# Patient Record
Sex: FEMALE | Race: ASIAN | NOT HISPANIC OR LATINO | Employment: FULL TIME | URBAN - METROPOLITAN AREA
[De-identification: names, ages, dates, MRNs, and addresses within clinical notes are randomized per-mention and may not be internally consistent; named-entity substitution may affect disease eponyms.]

---

## 2017-08-14 ENCOUNTER — LAB (OUTPATIENT)
Dept: FAMILY MEDICINE CLINIC | Facility: CLINIC | Age: 57
End: 2017-08-14

## 2017-08-14 DIAGNOSIS — R73.9 ELEVATED BLOOD SUGAR: ICD-10-CM

## 2017-08-14 DIAGNOSIS — Z00.00 ROUTINE GENERAL MEDICAL EXAMINATION AT A HEALTH CARE FACILITY: Primary | ICD-10-CM

## 2017-08-14 LAB
ALBUMIN SERPL-MCNC: 4.2 G/DL (ref 3.5–5.2)
ALBUMIN/GLOB SERPL: 1.4 G/DL
ALP SERPL-CCNC: 51 U/L (ref 39–117)
ALT SERPL W P-5'-P-CCNC: 17 U/L (ref 1–33)
ANION GAP SERPL CALCULATED.3IONS-SCNC: 12.1 MMOL/L
AST SERPL-CCNC: 15 U/L (ref 1–32)
BACTERIA UR QL AUTO: ABNORMAL /HPF
BILIRUB SERPL-MCNC: 0.4 MG/DL (ref 0.1–1.2)
BILIRUB UR QL STRIP: NEGATIVE
BUN BLD-MCNC: 8 MG/DL (ref 6–20)
BUN/CREAT SERPL: 11.6 (ref 7–25)
CALCIUM SPEC-SCNC: 9.5 MG/DL (ref 8.6–10.5)
CHLORIDE SERPL-SCNC: 102 MMOL/L (ref 98–107)
CHOLEST SERPL-MCNC: 252 MG/DL (ref 0–200)
CLARITY UR: CLEAR
CO2 SERPL-SCNC: 25.9 MMOL/L (ref 22–29)
COLOR UR: YELLOW
CREAT BLD-MCNC: 0.69 MG/DL (ref 0.57–1)
ERYTHROCYTE [DISTWIDTH] IN BLOOD BY AUTOMATED COUNT: 12.6 % (ref 4.5–15)
GFR SERPL CREATININE-BSD FRML MDRD: 106 ML/MIN/1.73
GFR SERPL CREATININE-BSD FRML MDRD: 87 ML/MIN/1.73
GLOBULIN UR ELPH-MCNC: 2.9 GM/DL
GLUCOSE BLD-MCNC: 129 MG/DL (ref 65–99)
GLUCOSE UR STRIP-MCNC: NEGATIVE MG/DL
HBA1C MFR BLD: 6.2 % (ref 4.8–5.6)
HCT VFR BLD AUTO: 41.5 % (ref 31–42)
HDLC SERPL-MCNC: 48 MG/DL (ref 40–60)
HGB BLD-MCNC: 13.6 G/DL (ref 12–18)
HGB UR QL STRIP.AUTO: ABNORMAL
KETONES UR QL STRIP: NEGATIVE
LDLC SERPL CALC-MCNC: 176 MG/DL (ref 0–100)
LDLC/HDLC SERPL: 3.67 {RATIO}
LEUKOCYTE ESTERASE UR QL STRIP.AUTO: ABNORMAL
LYMPHOCYTES # BLD AUTO: 2 10*3/MM3 (ref 1.2–3.4)
LYMPHOCYTES NFR BLD AUTO: 33.8 % (ref 21–51)
MCH RBC QN AUTO: 28.1 PG (ref 26.1–33.1)
MCHC RBC AUTO-ENTMCNC: 32.9 G/DL (ref 33–37)
MCV RBC AUTO: 85.3 FL (ref 80–99)
MONOCYTES # BLD AUTO: 0.2 10*3/MM3 (ref 0.1–0.6)
MONOCYTES NFR BLD AUTO: 3.9 % (ref 2–9)
NEUTROPHILS # BLD AUTO: 3.7 10*3/MM3 (ref 1.4–6.5)
NEUTROPHILS NFR BLD AUTO: 62.3 % (ref 42–75)
NITRITE UR QL STRIP: NEGATIVE
PH UR STRIP.AUTO: 6.5 [PH] (ref 4.6–8)
PLATELET # BLD AUTO: 240 10*3/MM3 (ref 150–450)
PMV BLD AUTO: 8 FL (ref 7.1–10.5)
POTASSIUM BLD-SCNC: 4.4 MMOL/L (ref 3.5–5.2)
PROT SERPL-MCNC: 7.1 G/DL (ref 6–8.5)
PROT UR QL STRIP: NEGATIVE
RBC # BLD AUTO: 4.86 10*6/MM3 (ref 4–6)
RBC # UR: ABNORMAL /HPF
REF LAB TEST METHOD: ABNORMAL
SODIUM BLD-SCNC: 140 MMOL/L (ref 136–145)
SP GR UR STRIP: 1.01 (ref 1–1.03)
SQUAMOUS #/AREA URNS HPF: ABNORMAL /HPF
TRIGL SERPL-MCNC: 139 MG/DL (ref 0–150)
TSH SERPL DL<=0.05 MIU/L-ACNC: 2.11 MIU/ML (ref 0.27–4.2)
UROBILINOGEN UR QL STRIP: ABNORMAL
VLDLC SERPL-MCNC: 27.8 MG/DL (ref 5–40)
WBC NRBC COR # BLD: 5.9 10*3/MM3 (ref 4.5–10)
WBC UR QL AUTO: ABNORMAL /HPF

## 2017-08-14 PROCEDURE — 80053 COMPREHEN METABOLIC PANEL: CPT | Performed by: NURSE PRACTITIONER

## 2017-08-14 PROCEDURE — 81001 URINALYSIS AUTO W/SCOPE: CPT | Performed by: NURSE PRACTITIONER

## 2017-08-14 PROCEDURE — 36415 COLL VENOUS BLD VENIPUNCTURE: CPT | Performed by: NURSE PRACTITIONER

## 2017-08-14 PROCEDURE — 83036 HEMOGLOBIN GLYCOSYLATED A1C: CPT | Performed by: NURSE PRACTITIONER

## 2017-08-14 PROCEDURE — 80061 LIPID PANEL: CPT | Performed by: NURSE PRACTITIONER

## 2017-08-14 PROCEDURE — 85025 COMPLETE CBC W/AUTO DIFF WBC: CPT | Performed by: NURSE PRACTITIONER

## 2017-08-14 PROCEDURE — 84443 ASSAY THYROID STIM HORMONE: CPT | Performed by: NURSE PRACTITIONER

## 2017-08-21 ENCOUNTER — OFFICE VISIT (OUTPATIENT)
Dept: FAMILY MEDICINE CLINIC | Facility: CLINIC | Age: 57
End: 2017-08-21

## 2017-08-21 VITALS
DIASTOLIC BLOOD PRESSURE: 74 MMHG | HEIGHT: 63 IN | HEART RATE: 85 BPM | BODY MASS INDEX: 32.78 KG/M2 | TEMPERATURE: 97.8 F | WEIGHT: 185 LBS | OXYGEN SATURATION: 99 % | SYSTOLIC BLOOD PRESSURE: 118 MMHG

## 2017-08-21 DIAGNOSIS — E78.5 HYPERLIPIDEMIA, UNSPECIFIED HYPERLIPIDEMIA TYPE: ICD-10-CM

## 2017-08-21 DIAGNOSIS — R94.31 ABNORMAL EKG: ICD-10-CM

## 2017-08-21 DIAGNOSIS — Z12.11 COLON CANCER SCREENING: ICD-10-CM

## 2017-08-21 DIAGNOSIS — Z11.59 NEED FOR HEPATITIS C SCREENING TEST: ICD-10-CM

## 2017-08-21 DIAGNOSIS — R10.9 LEFT FLANK PAIN: ICD-10-CM

## 2017-08-21 DIAGNOSIS — E03.9 HYPOTHYROIDISM, UNSPECIFIED TYPE: ICD-10-CM

## 2017-08-21 DIAGNOSIS — R31.9 HEMATURIA: ICD-10-CM

## 2017-08-21 DIAGNOSIS — Z01.419 ENCOUNTER FOR GYNECOLOGICAL EXAMINATION: ICD-10-CM

## 2017-08-21 DIAGNOSIS — K21.9 GASTROESOPHAGEAL REFLUX DISEASE, ESOPHAGITIS PRESENCE NOT SPECIFIED: ICD-10-CM

## 2017-08-21 DIAGNOSIS — Z78.0 POSTMENOPAUSAL: ICD-10-CM

## 2017-08-21 DIAGNOSIS — Z00.00 ANNUAL PHYSICAL EXAM: Primary | ICD-10-CM

## 2017-08-21 DIAGNOSIS — Z12.31 ENCOUNTER FOR SCREENING MAMMOGRAM FOR BREAST CANCER: ICD-10-CM

## 2017-08-21 DIAGNOSIS — I10 ESSENTIAL HYPERTENSION: ICD-10-CM

## 2017-08-21 LAB
BACTERIA UR QL AUTO: ABNORMAL /HPF
BILIRUB UR QL STRIP: NEGATIVE
CLARITY UR: CLEAR
COLOR UR: YELLOW
GLUCOSE UR STRIP-MCNC: NEGATIVE MG/DL
HCV AB SER DONR QL: NORMAL
HEMOCCULT STL QL IA: NEGATIVE
HGB UR QL STRIP.AUTO: ABNORMAL
KETONES UR QL STRIP: NEGATIVE
LEUKOCYTE ESTERASE UR QL STRIP.AUTO: ABNORMAL
NITRITE UR QL STRIP: NEGATIVE
PH UR STRIP.AUTO: 7 [PH] (ref 4.6–8)
PROT UR QL STRIP: NEGATIVE
RBC # UR: ABNORMAL /HPF
REF LAB TEST METHOD: ABNORMAL
SP GR UR STRIP: 1.01 (ref 1–1.03)
SQUAMOUS #/AREA URNS HPF: ABNORMAL /HPF
UROBILINOGEN UR QL STRIP: ABNORMAL
WBC UR QL AUTO: ABNORMAL /HPF

## 2017-08-21 PROCEDURE — 99386 PREV VISIT NEW AGE 40-64: CPT | Performed by: NURSE PRACTITIONER

## 2017-08-21 PROCEDURE — 99204 OFFICE O/P NEW MOD 45 MIN: CPT | Performed by: NURSE PRACTITIONER

## 2017-08-21 PROCEDURE — 81001 URINALYSIS AUTO W/SCOPE: CPT | Performed by: NURSE PRACTITIONER

## 2017-08-21 PROCEDURE — 82274 ASSAY TEST FOR BLOOD FECAL: CPT | Performed by: NURSE PRACTITIONER

## 2017-08-21 PROCEDURE — 86803 HEPATITIS C AB TEST: CPT | Performed by: NURSE PRACTITIONER

## 2017-08-21 PROCEDURE — 93000 ELECTROCARDIOGRAM COMPLETE: CPT | Performed by: NURSE PRACTITIONER

## 2017-08-21 PROCEDURE — 87086 URINE CULTURE/COLONY COUNT: CPT | Performed by: NURSE PRACTITIONER

## 2017-08-21 PROCEDURE — 36415 COLL VENOUS BLD VENIPUNCTURE: CPT | Performed by: NURSE PRACTITIONER

## 2017-08-21 RX ORDER — LEVOTHYROXINE SODIUM 0.05 MG/1
50 TABLET ORAL DAILY
COMMUNITY
End: 2017-08-21 | Stop reason: SDUPTHER

## 2017-08-21 RX ORDER — LISINOPRIL AND HYDROCHLOROTHIAZIDE 25; 20 MG/1; MG/1
1 TABLET ORAL DAILY
COMMUNITY
End: 2017-08-21 | Stop reason: SDUPTHER

## 2017-08-21 RX ORDER — LISINOPRIL AND HYDROCHLOROTHIAZIDE 25; 20 MG/1; MG/1
1 TABLET ORAL DAILY
Qty: 30 TABLET | Refills: 5 | Status: SHIPPED | OUTPATIENT
Start: 2017-08-21 | End: 2018-03-30 | Stop reason: DRUGHIGH

## 2017-08-21 RX ORDER — RANITIDINE 150 MG/1
150 TABLET ORAL 2 TIMES DAILY
Qty: 150 TABLET | Refills: 2 | Status: SHIPPED | OUTPATIENT
Start: 2017-08-21 | End: 2018-09-11

## 2017-08-21 RX ORDER — RANITIDINE 150 MG/1
150 CAPSULE ORAL 2 TIMES DAILY
Qty: 60 CAPSULE | Refills: 2 | Status: SHIPPED | OUTPATIENT
Start: 2017-08-21 | End: 2017-08-21 | Stop reason: ALTCHOICE

## 2017-08-21 RX ORDER — LEVOTHYROXINE SODIUM 0.05 MG/1
50 TABLET ORAL DAILY
Qty: 30 TABLET | Refills: 5 | Status: SHIPPED | OUTPATIENT
Start: 2017-08-21 | End: 2018-03-30 | Stop reason: SDUPTHER

## 2017-08-21 NOTE — PATIENT INSTRUCTIONS
complete physical today, CBE, pap, pelvic, order mammo, order overdue colonoscopy, EKG abnl order stress echo, reviewed labs will restart chol medication pending pt calling back with prev medication, refill lisinopril/hctz and levothyroxine, trial ranitidine 150 mg BID prn and if persist consider GI referral, order renal US and pending urine culture  Food Choices for Gastroesophageal Reflux Disease, Adult  When you have gastroesophageal reflux disease (GERD), the foods you eat and your eating habits are very important. Choosing the right foods can help ease the discomfort of GERD.  WHAT GENERAL GUIDELINES DO I NEED TO FOLLOW?  · Choose fruits, vegetables, whole grains, low-fat dairy products, and low-fat meat, fish, and poultry.  · Limit fats such as oils, salad dressings, butter, nuts, and avocado.  · Keep a food diary to identify foods that cause symptoms.  · Avoid foods that cause reflux. These may be different for different people.  · Eat frequent small meals instead of three large meals each day.  · Eat your meals slowly, in a relaxed setting.  · Limit fried foods.  · Cook foods using methods other than frying.  · Avoid drinking alcohol.  · Avoid drinking large amounts of liquids with your meals.  · Avoid bending over or lying down until 2-3 hours after eating.  WHAT FOODS ARE NOT RECOMMENDED?  The following are some foods and drinks that may worsen your symptoms:  Vegetables  Tomatoes. Tomato juice. Tomato and spaghetti sauce. Chili peppers. Onion and garlic. Horseradish.  Fruits  Oranges, grapefruit, and lemon (fruit and juice).  Meats  High-fat meats, fish, and poultry. This includes hot dogs, ribs, ham, sausage, salami, and daniels.  Dairy  Whole milk and chocolate milk. Sour cream. Cream. Butter. Ice cream. Cream cheese.   Beverages  Coffee and tea, with or without caffeine. Carbonated beverages or energy drinks.  Condiments  Hot sauce. Barbecue sauce.   Sweets/Desserts  Chocolate and cocoa. Donuts.  Peppermint and spearmint.  Fats and Oils  High-fat foods, including French fries and potato chips.  Other  Vinegar. Strong spices, such as black pepper, white pepper, red pepper, cayenne, mauro powder, cloves, ginger, and chili powder.  The items listed above may not be a complete list of foods and beverages to avoid. Contact your dietitian for more information.     This information is not intended to replace advice given to you by your health care provider. Make sure you discuss any questions you have with your health care provider.     Document Released: 12/18/2006 Document Revised: 01/08/2016 Document Reviewed: 10/22/2014  [employer] Interactive Patient Education ©2017 [employer]

## 2017-08-21 NOTE — PROGRESS NOTES
Subjective   Iain Frias is a 57 y.o. female.     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  With hypothyroid on levothyroxine 50 mcg 1 PO daily x 4 mo, no missed doses, Has lost approx 15 lbs since being on medication working on healthy diet  With HTN on lisinopril/HCTZ 20/25 mg daily no CP dizziness HA LE edema  Hx of pneumonia 2 years ago and since then has occ cough, now with worsening indigestion and heartburn and feels like difficult to digest food, Hx of constipation and takes OTC prn, no prev colonoscopy  Last mammo 2.5-3 years no breast pain, lumps, nipple dc, last pap > 2 years  C/o extreme pain L flank few weeks ago noticed blood in urine and took OTC herbal remedy and helped, now with pain 2/10, pain worse after okra and spinach and thinks may have kidney stone, no hx of stone or diverticulitis, hx of CT abd few years ago normal    Iain Frias 57 y.o. female is  2, Para 2 who presents for annual exam. The patient has no complaints today of vag dc, odor, itch, pain, dysuria, breast pain, lumps, nipple dc. The patient is not sexually active. GYN screening history: last pap: was normal approx 2-3 years ago. The patient is not taking hormone replacement therapy. Patient denies post-menopausal vaginal bleeding.. The patient participates in regular exercise: yes.  History of abnormal Pap smear: no  Family history of uterine or ovarian cancer: no  Family history of colon caner:  no  History of abnormal mammogram: no  Family history of breast cancer: no  Colonoscopy history:   Never had, order placed today  DEXA scan:  Never had, request order placed today  She declines STD testing.    The following portions of the patient's history were reviewed and updated as appropriate: allergies, current medications, past family history, past medical history, past social history, past surgical history and problem list.    Review of Systems    Constitutional: Negative for fever.   Respiratory: Negative for cough, shortness of breath and wheezing.    Cardiovascular: Negative for chest pain, palpitations and leg swelling.   Gastrointestinal: Positive for abdominal pain. Negative for abdominal distention, anal bleeding, blood in stool, constipation, diarrhea, nausea, rectal pain and vomiting.        Gerd   Genitourinary: Positive for flank pain and hematuria. Negative for decreased urine volume, difficulty urinating, dyspareunia, dysuria, frequency, menstrual problem, pelvic pain, urgency, vaginal bleeding, vaginal discharge and vaginal pain.   Musculoskeletal: Negative for arthralgias and back pain.   Neurological: Negative for dizziness and headaches.   All other systems reviewed and are negative.      Objective   Physical Exam   Constitutional: She is oriented to person, place, and time. She appears well-developed and well-nourished.   HENT:   Head: Normocephalic and atraumatic.   Eyes: Conjunctivae and EOM are normal. Pupils are equal, round, and reactive to light.   Cardiovascular: Normal rate, regular rhythm and normal heart sounds.    Pulmonary/Chest: Effort normal and breath sounds normal. Right breast exhibits no inverted nipple, no nipple discharge and no skin change. Left breast exhibits no inverted nipple, no nipple discharge and no skin change. Breasts are symmetrical. There is no breast swelling.   Abdominal: Soft. Bowel sounds are normal. She exhibits no distension and no mass. There is no tenderness. There is CVA tenderness (left). There is no rebound and no guarding. No hernia.   Genitourinary: Vagina normal and uterus normal. No breast tenderness, discharge or bleeding. Cervix exhibits no discharge. Right adnexum displays no tenderness. Left adnexum displays no tenderness.   Musculoskeletal: Normal range of motion.   Neurological: She is alert and oriented to person, place, and time.   Skin: Skin is warm and dry.   Psychiatric: She has a  normal mood and affect. Her behavior is normal. Judgment and thought content normal.   Vitals reviewed.    ECG 12 Lead  Date/Time: 8/21/2017 11:15 AM  Performed by: VILLA MORGAN  Authorized by: VILLA MORGAN   Comparison: not compared with previous ECG   Rhythm: sinus rhythm  Rate: normal  Conduction: conduction normal  ST Segments: ST segments normal  T depression: V1, V3 and V4  QRS axis: normal  Clinical impression: abnormal ECG          Assessment/Plan   Iain was seen today for annual exam.    Diagnoses and all orders for this visit:    Annual physical exam  -     Occult Blood (Immunoassay)  -     ECG 12 Lead    Colon cancer screening  -     Ambulatory Referral to General Surgery    Essential hypertension    Hyperlipidemia, unspecified hyperlipidemia type    Hypothyroidism, unspecified type    Need for hepatitis C screening test  -     Hepatitis C Antibody    Encounter for gynecological examination  -     Pap IG (Image Guided)    Encounter for screening mammogram for breast cancer  -     Mammo Screening Bilateral With CAD; Future    Left flank pain  -     Urinalysis With Microscopic  -     US Renal Bilateral; Future  -     Urinalysis  -     Urinalysis, Microscopic Only  -     Urine Culture    Hematuria  -     Urine Culture    Abnormal EKG  -     Adult Stress Echo With Color & Doppler; Future  -     ECG 12 Lead    Gastroesophageal reflux disease, esophagitis presence not specified    Postmenopausal  -     DEXA Bone Density Axial; Future    Other orders  -     Discontinue: ranitidine (ZANTAC) 150 MG capsule; Take 1 capsule by mouth 2 (Two) Times a Day.  -     raNITIdine (ZANTAC) 150 MG tablet; Take 1 tablet by mouth 2 (Two) Times a Day.  -     levothyroxine (SYNTHROID, LEVOTHROID) 50 MCG tablet; Take 1 tablet by mouth Daily.  -     lisinopril-hydrochlorothiazide (PRINZIDE,ZESTORETIC) 20-25 MG per tablet; Take 1 tablet by mouth Daily.    complete physical today, CBE, pap, pelvic, order mammo,  order overdue colonoscopy, EKG abnl order stress echo, reviewed labs will restart chol medication pending pt calling back with prev medication, refill lisinopril/hctz and levothyroxine, trial ranitidine 150 mg BID prn and if persist consider GI referral, order renal US and pending urine culture

## 2017-08-23 ENCOUNTER — TELEPHONE (OUTPATIENT)
Dept: FAMILY MEDICINE CLINIC | Facility: CLINIC | Age: 57
End: 2017-08-23

## 2017-08-23 LAB
BACTERIA SPEC AEROBE CULT: NO GROWTH
CHROM ANALY OVERALL INTERP-IMP: NORMAL
CONV .: NORMAL
CONV PERFORMED BY:: NORMAL
DX ICD CODE: NORMAL
HIV 1 & 2 AB SER-IMP: NORMAL
REF LAB TEST METHOD: NORMAL
STAT OF ADQ CVX/VAG CYTO-IMP: NORMAL

## 2017-08-23 NOTE — TELEPHONE ENCOUNTER
Pt informed    ----- Message from JAMES Bolton sent at 8/23/2017 10:17 AM EDT -----  Pap negative,normal, urine culture negative. Hep C screening negative, normal

## 2017-08-25 ENCOUNTER — HOSPITAL ENCOUNTER (OUTPATIENT)
Dept: ULTRASOUND IMAGING | Facility: HOSPITAL | Age: 57
Discharge: HOME | End: 2017-08-25
Admitting: NURSE PRACTITIONER

## 2017-08-25 DIAGNOSIS — R10.9 LEFT FLANK PAIN: ICD-10-CM

## 2017-08-25 PROCEDURE — 76775 US EXAM ABDO BACK WALL LIM: CPT

## 2017-09-05 ENCOUNTER — TELEPHONE (OUTPATIENT)
Dept: FAMILY MEDICINE CLINIC | Facility: CLINIC | Age: 57
End: 2017-09-05

## 2017-09-05 PROBLEM — N20.0 KIDNEY STONE: Status: ACTIVE | Noted: 2017-09-05

## 2017-09-05 NOTE — TELEPHONE ENCOUNTER
RESULTS ON ULTRASOUND DONE AT [facility], [place], operated by [facility]

## 2017-09-06 DIAGNOSIS — N20.0 KIDNEY STONE: Primary | ICD-10-CM

## 2017-09-11 ENCOUNTER — TELEPHONE (OUTPATIENT)
Dept: FAMILY MEDICINE CLINIC | Facility: CLINIC | Age: 57
End: 2017-09-11

## 2017-09-12 ENCOUNTER — APPOINTMENT (OUTPATIENT)
Dept: BONE DENSITY | Facility: HOSPITAL | Age: 57
End: 2017-09-12

## 2017-09-15 ENCOUNTER — TELEPHONE (OUTPATIENT)
Dept: FAMILY MEDICINE CLINIC | Facility: CLINIC | Age: 57
End: 2017-09-15

## 2017-09-19 ENCOUNTER — TELEPHONE (OUTPATIENT)
Dept: FAMILY MEDICINE CLINIC | Facility: CLINIC | Age: 57
End: 2017-09-19

## 2018-03-02 ENCOUNTER — OFFICE VISIT (OUTPATIENT)
Dept: FAMILY MEDICINE CLINIC | Facility: CLINIC | Age: 58
End: 2018-03-02

## 2018-03-02 VITALS
TEMPERATURE: 98.8 F | SYSTOLIC BLOOD PRESSURE: 130 MMHG | BODY MASS INDEX: 32.6 KG/M2 | RESPIRATION RATE: 18 BRPM | OXYGEN SATURATION: 99 % | WEIGHT: 184 LBS | DIASTOLIC BLOOD PRESSURE: 70 MMHG | HEART RATE: 76 BPM | HEIGHT: 63 IN

## 2018-03-02 DIAGNOSIS — R73.03 PREDIABETES: ICD-10-CM

## 2018-03-02 DIAGNOSIS — J20.9 ACUTE BRONCHITIS, UNSPECIFIED ORGANISM: Primary | ICD-10-CM

## 2018-03-02 DIAGNOSIS — E78.5 HYPERLIPIDEMIA, UNSPECIFIED HYPERLIPIDEMIA TYPE: ICD-10-CM

## 2018-03-02 DIAGNOSIS — T30.0 BURN: ICD-10-CM

## 2018-03-02 DIAGNOSIS — E66.09 CLASS 1 OBESITY DUE TO EXCESS CALORIES WITH SERIOUS COMORBIDITY AND BODY MASS INDEX (BMI) OF 32.0 TO 32.9 IN ADULT: ICD-10-CM

## 2018-03-02 DIAGNOSIS — I10 ESSENTIAL HYPERTENSION: ICD-10-CM

## 2018-03-02 DIAGNOSIS — R09.1 PLEURISY: ICD-10-CM

## 2018-03-02 DIAGNOSIS — E03.9 HYPOTHYROIDISM, UNSPECIFIED TYPE: ICD-10-CM

## 2018-03-02 DIAGNOSIS — R05.9 COUGH: ICD-10-CM

## 2018-03-02 PROCEDURE — 99214 OFFICE O/P EST MOD 30 MIN: CPT | Performed by: NURSE PRACTITIONER

## 2018-03-02 RX ORDER — LISINOPRIL AND HYDROCHLOROTHIAZIDE 12.5; 1 MG/1; MG/1
1 TABLET ORAL DAILY
Qty: 30 TABLET | Refills: 1 | Status: SHIPPED | OUTPATIENT
Start: 2018-03-02 | End: 2018-03-30 | Stop reason: DRUGHIGH

## 2018-03-02 RX ORDER — IBUPROFEN 600 MG/1
600 TABLET ORAL EVERY 8 HOURS PRN
Qty: 60 TABLET | Refills: 1 | Status: SHIPPED | OUTPATIENT
Start: 2018-03-02 | End: 2019-09-16 | Stop reason: SDUPTHER

## 2018-03-02 RX ORDER — BENZONATATE 100 MG/1
100 CAPSULE ORAL 3 TIMES DAILY PRN
Qty: 30 CAPSULE | Refills: 0 | Status: SHIPPED | OUTPATIENT
Start: 2018-03-02 | End: 2018-05-16

## 2018-03-02 NOTE — PATIENT INSTRUCTIONS
reviewed [facility] records, finish bactrim, trial tessalon perles 100 TID prn cough, increase H20 and rest, refer pulm, gave pt handout on pleurisy and start  TID prn pain after meals with H20, decrease lisinopril/hctz 10/12.5 mg daily and check BP at home, monitor dizziness, Enc healthy diet and regular exercise for wt loss, check fasting labs and call with results, erx silvadene cream, applied today with dressing, pt will call and reschedule overdue mammo, dexa and echo stress test, consider hand numbness SE of bactrim and will monitor  Pleurisy  Pleurisy, also called pleuritis, is irritation and swelling (inflammation) of the linings of the lungs. The linings of the lungs are called pleura. They cover the outside of the lungs and the inside of the chest wall. There is a small amount of fluid (pleural fluid) between the pleura that allows the lungs to move in and out smoothly when you breathe. Pleurisy causes the pleura to be rough and dry and to rub together when you breathe, which is painful. In some cases, pleurisy can cause pleural fluid to build up between the pleura (pleural effusion).  What are the causes?  Common causes of this condition include:  · A lung infection caused by bacteria or a virus.  · A blood clot that travels to the lung (pulmonary embolism).  · Air leaking into the pleural space (pneumothorax).  · Lung cancer or a lung tumor.  · A chest injury.  · Diseases that can cause lung inflammation. These include rheumatoid arthritis, lupus, sickle cell disease, inflammatory bowel disease, and pancreatitis.  · Heart or chest surgery.  · Lung damage from inhaling asbestos.  · A lung reaction to certain medicines.  Sometimes the cause is unknown.  What are the signs or symptoms?  Chest pain is the main symptom of this condition. The pain is usually on one side. Chest pain may start suddenly and be sharp or stabbing. It may become a constant dull ache. You may also feel pain in your back or  shoulder. The pain may get worse when you cough, take deep breaths, or make sudden movements. Other symptoms may include:  · Shortness of breath.  · Noisy breathing (wheezing).  · Cough.  · Chills.  · Fever.  How is this diagnosed?  This condition may be diagnosed based on:  · Your medical history.  · Your symptoms.  · A physical exam. Your health care provider will listen to your breathing with a stethoscope to check for a rough, rubbing sound (friction rub). If you have pleural effusion, your breathing sounds may be muffled.  · Tests, such as:  ¨ Blood tests to check for infections or diseases and to measure the oxygen in your blood.  ¨ Imaging studies of your lungs. These may include a chest X-ray, ultrasound, MRI, or CT scan.  ¨ A procedure to remove pleural fluid with a needle for testing (thoracentesis).  How is this treated?  Treatment for this condition depends on the cause. Pleurisy that was caused by a virus usually clears up within 2 weeks. Treatment for pleurisy may include:  · NSAIDs to help relieve pain and swelling.  · Antibiotic medicines, if your condition was caused by a bacterial infection.  · Prescription pain or cough medicine.  · Medicines to dissolve a blood clot, if your condition was caused by pulmonary embolism.  · Removal of pleural fluid or air.  Follow these instructions at home:  Medicines   · Take over-the-counter and prescription medicines only as told by your health care provider.  · If you were prescribed an antibiotic, take it as told by your health care provider. Do not stop taking the antibiotic even if you start to feel better.  Activity   · Rest and return to your normal activities as told by your health care provider. Ask your health care provider what activities are safe for you.  · Do not drive or use heavy machinery while taking prescription pain medicine.  General instructions   · Monitor your pleurisy for any changes.  · Take deep breaths often, even if it is painful.  This can help prevent lung infection (pneumonia) and collapse of lung tissue (atelectasis).  · When lying down, lie on your painful side. This may reduce pain.  · Do not smoke. If you need help quitting, ask your health care provider.  · Keep all follow-up visits as told by your health care provider. This is important.  Contact a health care provider if:  · You have pain that:  ¨ Gets worse.  ¨ Does not get better with medicine.  ¨ Lasts for more than 1 week.  · You have a fever or chills.  · Your cough or shortness of breath is not improving at home.  · You cough up pus-like (purulent) secretions.  Get help right away if:  · Your lips, fingernails, or toenails darken or turn blue.  · You cough up blood.  · You have any of the following symptoms that get worse:  ¨ Difficulty breathing.  ¨ Shortness of breath.  ¨ Wheezing.  · You have pain that spreads into your neck, arms, or jaw.  · You develop a rash.  · You vomit.  · You faint.  Summary  · Pleurisy is inflammation of the linings of the lungs (pleura).  · Pleurisy causes pain that makes it difficult for you to breathe or cough.  · Pleurisy is often caused by an underlying infection or disease.  · Treatment of pleurisy depends on the cause, and it often includes medicines.  This information is not intended to replace advice given to you by your health care provider. Make sure you discuss any questions you have with your health care provider.  Document Released: 12/18/2006 Document Revised: 09/11/2017 Document Reviewed: 09/11/2017  [employer] Interactive Patient Education © 2017 [employer]

## 2018-03-02 NOTE — PROGRESS NOTES
Subjective   Iain Frias is a 58 y.o. female.     History of Present Illness   C/o cough x 6 wks with chest tightness and PND with L sided sinus pain and congestion, saw dentist 12/17 and dx sinusitis tx abx x few days and didn't complete, then tried natural treatments, then went to [facility] 01/18 and got sick from water tx amox, then went to [facility] dx bronchitis and tx bactrim DS BID, with CXR NAD, now with cont cough and some chest tightness, pain with deep breathing or cough, With hx of PNA 2 years ago, wants to see pulm, no hx of asthma or COPD, not smoking, prev cough syrup promethazine DM too sedating, allergic codeine, with some B hand numbness x 10 days days  With HTN on lisinopril/hctz 20/25 mg with occ dizziness, no CP HA or LE edema, checks BP at home and runs similar, denies salting in diet  With hypothyroid on levothyroxine 50 mcg daily last TSH 08/17, last A1C 6.2 08/17  C/o burn R lower arm after steam in kitchen at home, request cream and dsg today  Never got mammo, dexa, or echo stress test, request rebook    The following portions of the patient's history were reviewed and updated as appropriate: allergies, current medications, past family history, past medical history, past social history, past surgical history and problem list.    Review of Systems   Constitutional: Negative for fever.   HENT: Positive for congestion, postnasal drip, sinus pain and sinus pressure. Negative for dental problem, drooling, ear discharge, ear pain, facial swelling, hearing loss, mouth sores, nosebleeds, rhinorrhea, sneezing, sore throat, tinnitus, trouble swallowing and voice change.    Respiratory: Positive for cough and chest tightness. Negative for apnea, choking, shortness of breath, wheezing and stridor.    Cardiovascular: Negative for chest pain, palpitations and leg swelling.   Gastrointestinal: Positive for nausea and vomiting. Negative for abdominal distention, abdominal pain, anal bleeding, blood in  stool, constipation, diarrhea and rectal pain.   Skin: Positive for color change and wound. Negative for pallor and rash.   Neurological: Positive for dizziness and numbness. Negative for tremors, seizures, syncope, facial asymmetry, speech difficulty, weakness, light-headedness and headaches.   All other systems reviewed and are negative.      Objective   Physical Exam   Constitutional: She is oriented to person, place, and time. She appears well-developed and well-nourished.   HENT:   Head: Normocephalic and atraumatic.   Right Ear: Hearing and tympanic membrane normal.   Left Ear: Hearing and tympanic membrane normal.   Nose: Nose normal. Right sinus exhibits no maxillary sinus tenderness and no frontal sinus tenderness. Left sinus exhibits no maxillary sinus tenderness and no frontal sinus tenderness.   Mouth/Throat: No oropharyngeal exudate, posterior oropharyngeal edema or posterior oropharyngeal erythema.   Eyes: Conjunctivae and EOM are normal. Pupils are equal, round, and reactive to light.   Neck: Normal range of motion. Neck supple. No thyromegaly present.   Cardiovascular: Normal rate, regular rhythm and normal heart sounds.    Pulmonary/Chest: Effort normal and breath sounds normal.   Musculoskeletal: Normal range of motion.   Lymphadenopathy:     She has no cervical adenopathy.   Neurological: She is alert and oriented to person, place, and time.   Skin: Skin is warm and dry. There is erythema (right lower forearm without dc or bleeding).   Psychiatric: She has a normal mood and affect. Her behavior is normal. Judgment and thought content normal.   Vitals reviewed.    Assessment/Plan   Iain was seen today for cough, chest pain and hypertension.    Diagnoses and all orders for this visit:    Acute bronchitis, unspecified organism  -     Cancel: XR Chest PA & Lateral  -     CBC & Differential  -     Ambulatory Referral to Pulmonology    Cough  -     Cancel: XR Chest PA & Lateral  -     CBC &  Differential  -     Ambulatory Referral to Pulmonology    Pleurisy    Essential hypertension  -     Lipid Panel  -     Comprehensive Metabolic Panel    Hyperlipidemia, unspecified hyperlipidemia type  -     Lipid Panel  -     Comprehensive Metabolic Panel    Hypothyroidism, unspecified type  -     TSH    Prediabetes  -     Hemoglobin A1c    Burn    Class 1 obesity due to excess calories with serious comorbidity and body mass index (BMI) of 32.0 to 32.9 in adult    Other orders  -     lisinopril-hydrochlorothiazide (ZESTORETIC) 10-12.5 MG per tablet; Take 1 tablet by mouth Daily.  -     benzonatate (TESSALON PERLES) 100 MG capsule; Take 1 capsule by mouth 3 (Three) Times a Day As Needed for Cough.  -     ibuprofen (ADVIL,MOTRIN) 600 MG tablet; Take 1 tablet by mouth Every 8 (Eight) Hours As Needed for Moderate Pain .  -     silver sulfadiazine (SILVADENE) 1 % cream; Apply  topically Daily.    reviewed [facility] records, finish bactrim, trial tessalon perles 100 TID prn cough, increase H20 and rest, refer pulm, gave pt handout on pleurisy and start  TID prn pain after meals with H20, decrease lisinopril/hctz 10/12.5 mg daily and check BP at home, monitor dizziness, Enc healthy diet and regular exercise for wt loss, check fasting labs and call with results, erx silvadene cream, applied today with dressing, pt will call and reschedule overdue mammo, dexa and echo stress test, consider hand numbness SE of bactrim and will monitor

## 2018-03-30 ENCOUNTER — TRANSCRIBE ORDERS (OUTPATIENT)
Dept: ADMINISTRATIVE | Facility: HOSPITAL | Age: 58
End: 2018-03-30

## 2018-03-30 ENCOUNTER — TELEPHONE (OUTPATIENT)
Dept: FAMILY MEDICINE CLINIC | Facility: CLINIC | Age: 58
End: 2018-03-30

## 2018-03-30 DIAGNOSIS — Z13.9 VISIT FOR SCREENING: Primary | ICD-10-CM

## 2018-03-30 LAB
ALBUMIN SERPL-MCNC: 4.3 G/DL (ref 3.5–5.2)
ALBUMIN/GLOB SERPL: 1.3 G/DL
ALP SERPL-CCNC: 53 U/L (ref 39–117)
ALT SERPL W P-5'-P-CCNC: 14 U/L (ref 1–33)
ANION GAP SERPL CALCULATED.3IONS-SCNC: 9.4 MMOL/L
AST SERPL-CCNC: 13 U/L (ref 1–32)
BILIRUB SERPL-MCNC: 0.4 MG/DL (ref 0.1–1.2)
BUN BLD-MCNC: 8 MG/DL (ref 8–23)
BUN/CREAT SERPL: 10.4 (ref 7–25)
CALCIUM SPEC-SCNC: 9.2 MG/DL (ref 8.6–10.5)
CHLORIDE SERPL-SCNC: 103 MMOL/L (ref 98–107)
CHOLEST SERPL-MCNC: 241 MG/DL (ref 0–200)
CO2 SERPL-SCNC: 29.6 MMOL/L (ref 22–29)
CREAT BLD-MCNC: 0.77 MG/DL (ref 0.57–1)
ERYTHROCYTE [DISTWIDTH] IN BLOOD BY AUTOMATED COUNT: 13.3 % (ref 4.5–15)
GFR SERPL CREATININE-BSD FRML MDRD: 76 ML/MIN/1.73
GFR SERPL CREATININE-BSD FRML MDRD: 93 ML/MIN/1.73
GLOBULIN UR ELPH-MCNC: 3.2 GM/DL
GLUCOSE BLD-MCNC: 104 MG/DL (ref 65–99)
HBA1C MFR BLD: 5.6 % (ref 4.8–5.6)
HCT VFR BLD AUTO: 41.2 % (ref 31–42)
HDLC SERPL-MCNC: 58 MG/DL (ref 40–60)
HGB BLD-MCNC: 14.4 G/DL (ref 12–18)
LDLC SERPL CALC-MCNC: 162 MG/DL (ref 0–100)
LDLC/HDLC SERPL: 2.79 {RATIO}
LYMPHOCYTES # BLD AUTO: 2.2 10*3/MM3 (ref 1.2–3.4)
LYMPHOCYTES NFR BLD AUTO: 33.8 % (ref 21–51)
MCH RBC QN AUTO: 29.8 PG (ref 26.1–33.1)
MCHC RBC AUTO-ENTMCNC: 35 G/DL (ref 33–37)
MCV RBC AUTO: 85.3 FL (ref 80–99)
MONOCYTES # BLD AUTO: 0.4 10*3/MM3 (ref 0.1–0.6)
MONOCYTES NFR BLD AUTO: 6.3 % (ref 2–9)
NEUTROPHILS # BLD AUTO: 4 10*3/MM3 (ref 1.4–6.5)
NEUTROPHILS NFR BLD AUTO: 59.9 % (ref 42–75)
PLATELET # BLD AUTO: 296 10*3/MM3 (ref 150–450)
PMV BLD AUTO: 7.7 FL (ref 7.1–10.5)
POTASSIUM BLD-SCNC: 4.5 MMOL/L (ref 3.5–5.2)
PROT SERPL-MCNC: 7.5 G/DL (ref 6–8.5)
RBC # BLD AUTO: 4.83 10*6/MM3 (ref 4–6)
SODIUM BLD-SCNC: 142 MMOL/L (ref 136–145)
TRIGL SERPL-MCNC: 107 MG/DL (ref 0–150)
TSH SERPL DL<=0.05 MIU/L-ACNC: 3.73 MIU/ML (ref 0.27–4.2)
VLDLC SERPL-MCNC: 21.4 MG/DL (ref 5–40)
WBC NRBC COR # BLD: 6.6 10*3/MM3 (ref 4.5–10)

## 2018-03-30 PROCEDURE — 85025 COMPLETE CBC W/AUTO DIFF WBC: CPT | Performed by: NURSE PRACTITIONER

## 2018-03-30 PROCEDURE — 84443 ASSAY THYROID STIM HORMONE: CPT | Performed by: NURSE PRACTITIONER

## 2018-03-30 PROCEDURE — 80061 LIPID PANEL: CPT | Performed by: NURSE PRACTITIONER

## 2018-03-30 PROCEDURE — 36415 COLL VENOUS BLD VENIPUNCTURE: CPT | Performed by: NURSE PRACTITIONER

## 2018-03-30 PROCEDURE — 83036 HEMOGLOBIN GLYCOSYLATED A1C: CPT | Performed by: NURSE PRACTITIONER

## 2018-03-30 PROCEDURE — 80053 COMPREHEN METABOLIC PANEL: CPT | Performed by: NURSE PRACTITIONER

## 2018-03-30 RX ORDER — ROSUVASTATIN CALCIUM 10 MG/1
10 TABLET, COATED ORAL DAILY
Qty: 30 TABLET | Refills: 5 | Status: SHIPPED | OUTPATIENT
Start: 2018-03-30 | End: 2018-05-16 | Stop reason: ALTCHOICE

## 2018-03-30 RX ORDER — LISINOPRIL AND HYDROCHLOROTHIAZIDE 25; 20 MG/1; MG/1
1 TABLET ORAL DAILY
Qty: 30 TABLET | Refills: 5 | Status: SHIPPED | OUTPATIENT
Start: 2018-03-30 | End: 2018-09-11 | Stop reason: SDUPTHER

## 2018-03-30 RX ORDER — LEVOTHYROXINE SODIUM 0.05 MG/1
50 TABLET ORAL DAILY
Qty: 30 TABLET | Refills: 5 | Status: SHIPPED | OUTPATIENT
Start: 2018-03-30 | End: 2018-09-11 | Stop reason: SDUPTHER

## 2018-03-30 NOTE — TELEPHONE ENCOUNTER
Pt ciera office stated she takes crestor 10mg, she needs bp med increased to lisinopril hct 20/20mg and needs refill on levothyroxine 50mcg. She needs refills on all meds sent to [facility] on [facility] [address]

## 2018-04-02 ENCOUNTER — TELEPHONE (OUTPATIENT)
Dept: FAMILY MEDICINE CLINIC | Facility: CLINIC | Age: 58
End: 2018-04-02

## 2018-04-04 ENCOUNTER — APPOINTMENT (OUTPATIENT)
Dept: CARDIOLOGY | Facility: HOSPITAL | Age: 58
End: 2018-04-04

## 2018-05-11 ENCOUNTER — TELEPHONE (OUTPATIENT)
Dept: FAMILY MEDICINE CLINIC | Facility: CLINIC | Age: 58
End: 2018-05-11

## 2018-05-11 NOTE — TELEPHONE ENCOUNTER
PATIENT BLOOD SUGAR HAS BEEN BETWEEN 150 -180 AFTER EATING   PATIENT IS NOT ON ANY MEDICATION FOR BLOOD SUGAR

## 2018-05-11 NOTE — TELEPHONE ENCOUNTER
Last a1c in 3/18 was normal, BS will be high after meals d/t eating, if checking should be 2 hours after a meal. If symptomatic needs to be seen, dizzy, lightheaded sweaty, other wise can f/u in office with tito next week.

## 2018-05-16 ENCOUNTER — OFFICE VISIT (OUTPATIENT)
Dept: FAMILY MEDICINE CLINIC | Facility: CLINIC | Age: 58
End: 2018-05-16

## 2018-05-16 VITALS
BODY MASS INDEX: 32.07 KG/M2 | SYSTOLIC BLOOD PRESSURE: 110 MMHG | OXYGEN SATURATION: 99 % | HEART RATE: 69 BPM | DIASTOLIC BLOOD PRESSURE: 64 MMHG | HEIGHT: 63 IN | WEIGHT: 181 LBS | TEMPERATURE: 98.2 F

## 2018-05-16 DIAGNOSIS — E78.5 HYPERLIPIDEMIA, UNSPECIFIED HYPERLIPIDEMIA TYPE: ICD-10-CM

## 2018-05-16 DIAGNOSIS — Z12.11 COLON CANCER SCREENING: ICD-10-CM

## 2018-05-16 DIAGNOSIS — E66.09 CLASS 1 OBESITY DUE TO EXCESS CALORIES WITH SERIOUS COMORBIDITY AND BODY MASS INDEX (BMI) OF 32.0 TO 32.9 IN ADULT: ICD-10-CM

## 2018-05-16 DIAGNOSIS — E03.9 HYPOTHYROIDISM, UNSPECIFIED TYPE: ICD-10-CM

## 2018-05-16 DIAGNOSIS — K59.00 CONSTIPATION, UNSPECIFIED CONSTIPATION TYPE: ICD-10-CM

## 2018-05-16 DIAGNOSIS — I10 ESSENTIAL HYPERTENSION: ICD-10-CM

## 2018-05-16 DIAGNOSIS — R73.03 PREDIABETES: Primary | ICD-10-CM

## 2018-05-16 PROCEDURE — 99214 OFFICE O/P EST MOD 30 MIN: CPT | Performed by: NURSE PRACTITIONER

## 2018-05-16 NOTE — PATIENT INSTRUCTIONS
trial metformin 500 mg with largest meal and check BS and BP at home, Enc healthy diet and regular exercise for wt loss, stop crestor and trial livalo 2 mg and gave coupon and monitor for SE, cont all chronic dz meds, increase H20 and monitor constipation, refuse colonoscopy refer cologoanh

## 2018-05-16 NOTE — PROGRESS NOTES
Subjective   Iain Frias is a 58 y.o. female.     History of Present Illness   Here to FU on elevated -150s AM and up to 180-190s, with last A1C 5.6 03/18, taking OTC chromium and cinnamon and low sugar diet but did eat approx 3 wks ago and elevated BS 300s, increased H20 and eating less portion and no fried foods with weight loss 3 lbs since 03/18, no fam hx of DM, wondering if could take medication to help lower BS and help with weight loss  With chol on crestor 10 mg but thinks SE HA x 3 days and stopped, since then HA has resolved and doesn't want to take again  Saw pulmonology for chronic cough and thought r/t acid reflux and recommended PPI but thinks contributing to abd pain and distension, no prev colonoscopy and doesn't want d/t risk of perforation and works med malpractice, no fam hx of colon ca  With HTN well controlled on lisinopril/HCTZ and hypothyroid on levothyroxine no missed doses with last TSH nl 03/18    The following portions of the patient's history were reviewed and updated as appropriate: allergies, current medications, past family history, past medical history, past social history, past surgical history and problem list.    Review of Systems   Constitutional: Negative for fever.   HENT: Negative for trouble swallowing and voice change.    Respiratory: Negative for cough, shortness of breath and wheezing.    Cardiovascular: Negative for chest pain, palpitations and leg swelling.   Gastrointestinal: Positive for abdominal distention, abdominal pain and constipation. Negative for anal bleeding, blood in stool, diarrhea, nausea, rectal pain and vomiting.   Endocrine: Negative for cold intolerance, heat intolerance, polydipsia, polyphagia and polyuria.   Neurological: Negative for dizziness and headaches.   All other systems reviewed and are negative.      Objective   Physical Exam   Constitutional: She is oriented to person, place, and time. She appears well-developed and well-nourished.    HENT:   Head: Normocephalic and atraumatic.   Eyes: Conjunctivae and EOM are normal. Pupils are equal, round, and reactive to light.   Cardiovascular: Normal rate, regular rhythm and normal heart sounds.    Pulmonary/Chest: Effort normal and breath sounds normal.   Musculoskeletal: Normal range of motion.   Neurological: She is alert and oriented to person, place, and time.   Skin: Skin is warm and dry.   Psychiatric: She has a normal mood and affect. Her behavior is normal. Judgment and thought content normal.   Vitals reviewed.      Assessment/Plan   Iain was seen today for follow-up, medication problem, constipation and obesity.    Diagnoses and all orders for this visit:    Prediabetes    Class 1 obesity due to excess calories with serious comorbidity and body mass index (BMI) of 32.0 to 32.9 in adult    Hyperlipidemia, unspecified hyperlipidemia type    Essential hypertension    Hypothyroidism, unspecified type    Colon cancer screening  -     Cologuard - Stool, Per Rectum    Constipation, unspecified constipation type    Other orders  -     metFORMIN (GLUCOPHAGE) 500 MG tablet; 1 PO daily with largest meal  -     pitavastatin calcium (LIVALO) 2 MG tablet tablet; Take 1 tablet by mouth Every Night.    trial metformin 500 mg with largest meal and check BS and BP at home, Enc healthy diet and regular exercise for wt loss, stop crestor and trial livalo 2 mg and gave coupon and monitor for SE, cont all chronic dz meds, increase H20 and monitor constipation, refuse colonoscopy refer cologuard

## 2018-06-05 ENCOUNTER — TELEPHONE (OUTPATIENT)
Dept: FAMILY MEDICINE CLINIC | Facility: CLINIC | Age: 58
End: 2018-06-05

## 2018-09-11 ENCOUNTER — OFFICE VISIT (OUTPATIENT)
Dept: FAMILY MEDICINE CLINIC | Facility: CLINIC | Age: 58
End: 2018-09-11

## 2018-09-11 ENCOUNTER — RESULTS ENCOUNTER (OUTPATIENT)
Dept: FAMILY MEDICINE CLINIC | Facility: CLINIC | Age: 58
End: 2018-09-11

## 2018-09-11 VITALS
OXYGEN SATURATION: 98 % | SYSTOLIC BLOOD PRESSURE: 128 MMHG | TEMPERATURE: 97.8 F | HEART RATE: 80 BPM | BODY MASS INDEX: 31.18 KG/M2 | HEIGHT: 63 IN | DIASTOLIC BLOOD PRESSURE: 88 MMHG | WEIGHT: 176 LBS

## 2018-09-11 DIAGNOSIS — Z00.00 ANNUAL PHYSICAL EXAM: Primary | ICD-10-CM

## 2018-09-11 DIAGNOSIS — Z12.11 COLON CANCER SCREENING: ICD-10-CM

## 2018-09-11 DIAGNOSIS — E78.5 HYPERLIPIDEMIA, UNSPECIFIED HYPERLIPIDEMIA TYPE: ICD-10-CM

## 2018-09-11 DIAGNOSIS — E03.9 HYPOTHYROIDISM, UNSPECIFIED TYPE: ICD-10-CM

## 2018-09-11 DIAGNOSIS — I10 ESSENTIAL HYPERTENSION: ICD-10-CM

## 2018-09-11 DIAGNOSIS — R94.31 ABNORMAL EKG: ICD-10-CM

## 2018-09-11 DIAGNOSIS — K21.9 GASTROESOPHAGEAL REFLUX DISEASE, ESOPHAGITIS PRESENCE NOT SPECIFIED: ICD-10-CM

## 2018-09-11 DIAGNOSIS — E66.09 CLASS 1 OBESITY DUE TO EXCESS CALORIES WITH SERIOUS COMORBIDITY AND BODY MASS INDEX (BMI) OF 31.0 TO 31.9 IN ADULT: ICD-10-CM

## 2018-09-11 DIAGNOSIS — R73.03 PREDIABETES: ICD-10-CM

## 2018-09-11 DIAGNOSIS — Z12.31 ENCOUNTER FOR SCREENING MAMMOGRAM FOR BREAST CANCER: ICD-10-CM

## 2018-09-11 LAB
ALBUMIN SERPL-MCNC: 4.4 G/DL (ref 3.5–5.2)
ALBUMIN/GLOB SERPL: 1.6 G/DL
ALP SERPL-CCNC: 58 U/L (ref 39–117)
ALT SERPL W P-5'-P-CCNC: 13 U/L (ref 1–33)
ANION GAP SERPL CALCULATED.3IONS-SCNC: 11 MMOL/L
AST SERPL-CCNC: 13 U/L (ref 1–32)
BACTERIA UR QL AUTO: ABNORMAL /HPF
BILIRUB SERPL-MCNC: 0.2 MG/DL (ref 0.1–1.2)
BILIRUB UR QL STRIP: NEGATIVE
BUN BLD-MCNC: 9 MG/DL (ref 8–23)
BUN/CREAT SERPL: 12.2 (ref 7–25)
CALCIUM SPEC-SCNC: 9.4 MG/DL (ref 8.6–10.5)
CHLORIDE SERPL-SCNC: 100 MMOL/L (ref 98–107)
CHOLEST SERPL-MCNC: 266 MG/DL (ref 0–200)
CLARITY UR: CLEAR
CO2 SERPL-SCNC: 29 MMOL/L (ref 22–29)
COLOR UR: YELLOW
CREAT BLD-MCNC: 0.74 MG/DL (ref 0.57–1)
ERYTHROCYTE [DISTWIDTH] IN BLOOD BY AUTOMATED COUNT: 13.6 % (ref 4.5–15)
GFR SERPL CREATININE-BSD FRML MDRD: 80 ML/MIN/1.73
GFR SERPL CREATININE-BSD FRML MDRD: 97 ML/MIN/1.73
GLOBULIN UR ELPH-MCNC: 2.8 GM/DL
GLUCOSE BLD-MCNC: 112 MG/DL (ref 65–99)
GLUCOSE UR STRIP-MCNC: NEGATIVE MG/DL
HBA1C MFR BLD: 5.72 % (ref 4.8–5.6)
HCT VFR BLD AUTO: 40.6 % (ref 31–42)
HDLC SERPL-MCNC: 55 MG/DL (ref 40–60)
HGB BLD-MCNC: 13.6 G/DL (ref 12–18)
HGB UR QL STRIP.AUTO: NEGATIVE
KETONES UR QL STRIP: NEGATIVE
LDLC SERPL CALC-MCNC: 189 MG/DL (ref 0–100)
LDLC/HDLC SERPL: 3.44 {RATIO}
LEUKOCYTE ESTERASE UR QL STRIP.AUTO: ABNORMAL
LYMPHOCYTES # BLD AUTO: 2 10*3/MM3 (ref 1.2–3.4)
LYMPHOCYTES NFR BLD AUTO: 34 % (ref 21–51)
MCH RBC QN AUTO: 27.9 PG (ref 26.1–33.1)
MCHC RBC AUTO-ENTMCNC: 33.6 G/DL (ref 33–37)
MCV RBC AUTO: 83.2 FL (ref 80–99)
MONOCYTES # BLD AUTO: 0.3 10*3/MM3 (ref 0.1–0.6)
MONOCYTES NFR BLD AUTO: 4.4 % (ref 2–9)
NEUTROPHILS # BLD AUTO: 3.6 10*3/MM3 (ref 1.4–6.5)
NEUTROPHILS NFR BLD AUTO: 61.6 % (ref 42–75)
NITRITE UR QL STRIP: NEGATIVE
PH UR STRIP.AUTO: 7 [PH] (ref 4.6–8)
PLATELET # BLD AUTO: 312 10*3/MM3 (ref 150–450)
PMV BLD AUTO: 7.7 FL (ref 7.1–10.5)
POTASSIUM BLD-SCNC: 4.2 MMOL/L (ref 3.5–5.2)
PROT SERPL-MCNC: 7.2 G/DL (ref 6–8.5)
PROT UR QL STRIP: NEGATIVE
RBC # BLD AUTO: 4.88 10*6/MM3 (ref 4–6)
RBC # UR: ABNORMAL /HPF
REF LAB TEST METHOD: ABNORMAL
SODIUM BLD-SCNC: 140 MMOL/L (ref 136–145)
SP GR UR STRIP: 1.01 (ref 1–1.03)
SQUAMOUS #/AREA URNS HPF: ABNORMAL /HPF
TRIGL SERPL-MCNC: 108 MG/DL (ref 0–150)
TSH SERPL DL<=0.05 MIU/L-ACNC: 3.31 MIU/ML (ref 0.27–4.2)
UROBILINOGEN UR QL STRIP: ABNORMAL
VLDLC SERPL-MCNC: 21.6 MG/DL (ref 5–40)
WBC NRBC COR # BLD: 5.8 10*3/MM3 (ref 4.5–10)
WBC UR QL AUTO: ABNORMAL /HPF

## 2018-09-11 PROCEDURE — 81001 URINALYSIS AUTO W/SCOPE: CPT | Performed by: NURSE PRACTITIONER

## 2018-09-11 PROCEDURE — 36415 COLL VENOUS BLD VENIPUNCTURE: CPT | Performed by: NURSE PRACTITIONER

## 2018-09-11 PROCEDURE — 85025 COMPLETE CBC W/AUTO DIFF WBC: CPT | Performed by: NURSE PRACTITIONER

## 2018-09-11 PROCEDURE — 99396 PREV VISIT EST AGE 40-64: CPT | Performed by: NURSE PRACTITIONER

## 2018-09-11 PROCEDURE — 93000 ELECTROCARDIOGRAM COMPLETE: CPT | Performed by: NURSE PRACTITIONER

## 2018-09-11 PROCEDURE — 84443 ASSAY THYROID STIM HORMONE: CPT | Performed by: NURSE PRACTITIONER

## 2018-09-11 PROCEDURE — 80053 COMPREHEN METABOLIC PANEL: CPT | Performed by: NURSE PRACTITIONER

## 2018-09-11 PROCEDURE — 83036 HEMOGLOBIN GLYCOSYLATED A1C: CPT | Performed by: NURSE PRACTITIONER

## 2018-09-11 PROCEDURE — 80061 LIPID PANEL: CPT | Performed by: NURSE PRACTITIONER

## 2018-09-11 RX ORDER — BENZONATATE 100 MG/1
100 CAPSULE ORAL 3 TIMES DAILY PRN
Qty: 30 CAPSULE | Refills: 0 | Status: SHIPPED | OUTPATIENT
Start: 2018-09-11 | End: 2018-12-19 | Stop reason: SDUPTHER

## 2018-09-11 RX ORDER — OMEPRAZOLE 20 MG/1
20 CAPSULE, DELAYED RELEASE ORAL DAILY
Qty: 30 CAPSULE | Refills: 5 | Status: SHIPPED | OUTPATIENT
Start: 2018-09-11 | End: 2020-09-22

## 2018-09-11 RX ORDER — BENZONATATE 100 MG/1
100 CAPSULE ORAL 3 TIMES DAILY PRN
COMMUNITY
End: 2018-09-11

## 2018-09-11 RX ORDER — LISINOPRIL AND HYDROCHLOROTHIAZIDE 25; 20 MG/1; MG/1
1 TABLET ORAL DAILY
Qty: 90 TABLET | Refills: 3 | Status: SHIPPED | OUTPATIENT
Start: 2018-09-11 | End: 2018-12-19 | Stop reason: SDUPTHER

## 2018-09-11 RX ORDER — LEVOTHYROXINE SODIUM 0.05 MG/1
50 TABLET ORAL DAILY
Qty: 90 TABLET | Refills: 3 | Status: SHIPPED | OUTPATIENT
Start: 2018-09-11 | End: 2018-12-19 | Stop reason: SDUPTHER

## 2018-09-11 NOTE — PROGRESS NOTES
Subjective   Iain Frias is a 58 y.o. female.     History of Present Illness   Here for annual physical, with chronic hypothyroid on levothyroxine 50 mcg 1 PO daily no missed doses, with chronic HTN on lisinopril/HCTZ 20/25 mg daily no CP dizziness HA LE edema, with chol unable to tolerate statin and no longer taking livalo, with cough notices more with cold food and request refill tessalon perles prn #30 last 6 mo with last CXR 02/18 normal, with reflux has tried ranitidine no help, With hx of prediabetes on metformin 500 mg BID last A1C 5.6 03/18, now only taking daily prn and monitoring BS at home, with weight loss 7 lbs working on healthy diet, no prev colonoscopy and doesn't want, request cologuard, Last mammo > 3 years no breast pain, lumps, nipple dc, last pap normal 08/21/17 here, Last dental UTD, Last vision UTD    The following portions of the patient's history were reviewed and updated as appropriate: allergies, current medications, past family history, past medical history, past social history, past surgical history and problem list.    Review of Systems   Constitutional: Negative for fever.   Eyes: Negative for visual disturbance.   Respiratory: Positive for cough. Negative for shortness of breath and wheezing.    Cardiovascular: Negative for chest pain, palpitations and leg swelling.   Gastrointestinal: Negative for abdominal distention, abdominal pain, anal bleeding, blood in stool, constipation, diarrhea, nausea, rectal pain and vomiting.        Gerd    Endocrine: Negative for cold intolerance, heat intolerance, polydipsia, polyphagia and polyuria.   Musculoskeletal: Negative for arthralgias.   Neurological: Negative for dizziness and headaches.   All other systems reviewed and are negative.      Objective   Physical Exam   Constitutional: She is oriented to person, place, and time. She appears well-developed and well-nourished.   HENT:   Head: Normocephalic and atraumatic.   Right Ear: Hearing and  tympanic membrane normal.   Left Ear: Hearing and tympanic membrane normal.   Nose: Nose normal. Right sinus exhibits no maxillary sinus tenderness and no frontal sinus tenderness. Left sinus exhibits no maxillary sinus tenderness and no frontal sinus tenderness.   Mouth/Throat: Oropharynx is clear and moist and mucous membranes are normal.   Eyes: Pupils are equal, round, and reactive to light. Conjunctivae and EOM are normal.   Neck: Normal range of motion. Neck supple. No thyromegaly present.   Cardiovascular: Normal rate, regular rhythm and normal heart sounds.    Pulmonary/Chest: Effort normal and breath sounds normal. Right breast exhibits no inverted nipple, no mass and no skin change. Left breast exhibits no inverted nipple, no mass and no skin change. Breasts are symmetrical. There is no breast swelling.   Abdominal: Soft. She exhibits no distension and no mass. There is no tenderness. There is no rebound and no guarding. No hernia.   Genitourinary: No breast tenderness, discharge or bleeding.   Musculoskeletal: Normal range of motion.   Lymphadenopathy:     She has no cervical adenopathy.   Neurological: She is alert and oriented to person, place, and time.   Skin: Skin is warm and dry.   Psychiatric: She has a normal mood and affect. Her behavior is normal. Judgment and thought content normal.   Vitals reviewed.    ECG 12 Lead  Date/Time: 9/11/2018 10:49 AM  Performed by: VILLA MORGAN  Authorized by: VILLA MORGAN   Comparison: compared with previous ECG from 8/21/2017  Similar to previous ECG  Rhythm: sinus rhythm  Rate: normal  Conduction: conduction normal  ST Segments: ST segments normal  T depression: V1, V3 and V4  QRS axis: normal  Other: no other findings  Clinical impression: abnormal ECG          Assessment/Plan   Iain was seen today for annual exam.    Diagnoses and all orders for this visit:    Annual physical exam  -     CBC & Differential  -     Comprehensive Metabolic  Panel  -     Lipid Panel  -     TSH  -     Urinalysis With Microscopic - Urine, Clean Catch  -     Hemoglobin A1c  -     ECG 12 Lead  -     CBC Auto Differential  -     Urinalysis without microscopic (no culture) - Urine, Clean Catch  -     Urinalysis, Microscopic Only - Urine, Clean Catch    Colon cancer screening  -     Cologuard - Stool, Per Rectum; Future    Encounter for screening mammogram for breast cancer  -     Mammo Screening Bilateral With CAD; Future    Essential hypertension  -     Comprehensive Metabolic Panel  -     Lipid Panel  -     Treadmill Stress Test; Future    Hyperlipidemia, unspecified hyperlipidemia type  -     Comprehensive Metabolic Panel  -     Lipid Panel  -     Treadmill Stress Test; Future    Hypothyroidism, unspecified type  -     TSH    Class 1 obesity due to excess calories with serious comorbidity and body mass index (BMI) of 31.0 to 31.9 in adult  -     Hemoglobin A1c  -     Treadmill Stress Test; Future    Prediabetes  -     Treadmill Stress Test; Future    Gastroesophageal reflux disease, esophagitis presence not specified    Abnormal EKG  -     Treadmill Stress Test; Future    Other orders  -     benzonatate (TESSALON) 100 MG capsule; Take 1 capsule by mouth 3 (Three) Times a Day As Needed for Cough.  -     metFORMIN (GLUCOPHAGE) 500 MG tablet; Take 1 tablet by mouth 2 (Two) Times a Day With Meals. 1 PO daily with largest meal  -     lisinopril-hydrochlorothiazide (PRINZIDE,ZESTORETIC) 20-25 MG per tablet; Take 1 tablet by mouth Daily.  -     levothyroxine (SYNTHROID, LEVOTHROID) 50 MCG tablet; Take 1 tablet by mouth Daily.  -     omeprazole (priLOSEC) 20 MG capsule; Take 1 capsule by mouth Daily.    complete physical today, EKG no change from last year reorder stress test, congratulated on weight loss and cont healthy diet and regular exercise for wt loss, order cologuard and mammo, CBE today, cont all chronic dz meds, check BP and BS at home, trial omeprazole 20 mg daily and  monitor

## 2018-09-11 NOTE — PATIENT INSTRUCTIONS
complete physical today, EKG no change from last year reorder stress test, congratulated on weight loss and cont healthy diet and regular exercise for wt loss, order cologuard and mammo, CBE today, cont all chronic dz meds, check BP and BS at home, trial omeprazole 20 mg daily and monitor  Food Choices for Gastroesophageal Reflux Disease, Adult  When you have gastroesophageal reflux disease (GERD), the foods you eat and your eating habits are very important. Choosing the right foods can help ease the discomfort of GERD. Consider working with a diet and nutrition specialist (dietitian) to help you make healthy food choices.  What general guidelines should I follow?  Eating plan  · Choose healthy foods low in fat, such as fruits, vegetables, whole grains, low-fat dairy products, and lean meat, fish, and poultry.  · Eat frequent, small meals instead of three large meals each day. Eat your meals slowly, in a relaxed setting. Avoid bending over or lying down until 2-3 hours after eating.  · Limit high-fat foods such as fatty meats or fried foods.  · Limit your intake of oils, butter, and shortening to less than 8 teaspoons each day.  · Avoid the following:  ? Foods that cause symptoms. These may be different for different people. Keep a food diary to keep track of foods that cause symptoms.  ? Alcohol.  ? Drinking large amounts of liquid with meals.  ? Eating meals during the 2-3 hours before bed.  · Cook foods using methods other than frying. This may include baking, grilling, or broiling.  Lifestyle    · Maintain a healthy weight. Ask your health care provider what weight is healthy for you. If you need to lose weight, work with your health care provider to do so safely.  · Exercise for at least 30 minutes on 5 or more days each week, or as told by your health care provider.  · Avoid wearing clothes that fit tightly around your waist and chest.  · Do not use any products that contain nicotine or tobacco, such as  cigarettes and e-cigarettes. If you need help quitting, ask your health care provider.  · Sleep with the head of your bed raised. Use a wedge under the mattress or blocks under the bed frame to raise the head of the bed.  What foods are not recommended?  The items listed may not be a complete list. Talk with your dietitian about what dietary choices are best for you.  Grains  Pastries or quick breads with added fat. French toast.  Vegetables  Deep fried vegetables. French fries. Any vegetables prepared with added fat. Any vegetables that cause symptoms. For some people this may include tomatoes and tomato products, chili peppers, onions and garlic, and horseradish.  Fruits  Any fruits prepared with added fat. Any fruits that cause symptoms. For some people this may include citrus fruits, such as oranges, grapefruit, pineapple, and rosalina.  Meats and other protein foods  High-fat meats, such as fatty beef or pork, hot dogs, ribs, ham, sausage, salami and daniels. Fried meat or protein, including fried fish and fried chicken. Nuts and nut butters.  Dairy  Whole milk and chocolate milk. Sour cream. Cream. Ice cream. Cream cheese. Milk shakes.  Beverages  Coffee and tea, with or without caffeine. Carbonated beverages. Sodas. Energy drinks. Fruit juice made with acidic fruits (such as orange or grapefruit). Tomato juice. Alcoholic drinks.  Fats and oils  Butter. Margarine. Shortening. Ghee.  Sweets and desserts  Chocolate and cocoa. Donuts.  Seasoning and other foods  Pepper. Peppermint and spearmint. Any condiments, herbs, or seasonings that cause symptoms. For some people, this may include mauro, hot sauce, or vinegar-based salad dressings.  Summary  · When you have gastroesophageal reflux disease (GERD), food and lifestyle choices are very important to help ease the discomfort of GERD.  · Eat frequent, small meals instead of three large meals each day. Eat your meals slowly, in a relaxed setting. Avoid bending over or  lying down until 2-3 hours after eating.  · Limit high-fat foods such as fatty meat or fried foods.  This information is not intended to replace advice given to you by your health care provider. Make sure you discuss any questions you have with your health care provider.  Document Released: 12/18/2006 Document Revised: 12/19/2017 Document Reviewed: 12/19/2017  [employer] Patient Education © 2018 [employer]

## 2018-09-12 ENCOUNTER — TELEPHONE (OUTPATIENT)
Dept: FAMILY MEDICINE CLINIC | Facility: CLINIC | Age: 58
End: 2018-09-12

## 2018-09-12 NOTE — TELEPHONE ENCOUNTER
Thyroid, kidney, liver normal. BS sl elevated 112 and A1C elevated 5.7 consistent with prediabetes, but improved since last checked from last year, cont metformin. Enc healthy diet and regular exercise for wt loss. Your chol is elevated 266 goal < 200,  goal < 100. I know you can't tolerate statins. Recommend trial zetia 10 mg daily and we can recheck labs in 2-3 mo. Can I erx? No anemias, urine normal

## 2018-09-17 ENCOUNTER — TELEPHONE (OUTPATIENT)
Dept: FAMILY MEDICINE CLINIC | Facility: CLINIC | Age: 58
End: 2018-09-17

## 2018-09-18 ENCOUNTER — TELEPHONE (OUTPATIENT)
Dept: FAMILY MEDICINE CLINIC | Facility: CLINIC | Age: 58
End: 2018-09-18

## 2018-12-19 RX ORDER — LEVOTHYROXINE SODIUM 0.05 MG/1
50 TABLET ORAL DAILY
Qty: 90 TABLET | Refills: 3 | Status: SHIPPED | OUTPATIENT
Start: 2018-12-19 | End: 2019-09-16 | Stop reason: SDUPTHER

## 2018-12-19 RX ORDER — LISINOPRIL AND HYDROCHLOROTHIAZIDE 25; 20 MG/1; MG/1
1 TABLET ORAL DAILY
Qty: 90 TABLET | Refills: 3 | Status: SHIPPED | OUTPATIENT
Start: 2018-12-19 | End: 2019-09-16 | Stop reason: SDUPTHER

## 2018-12-19 RX ORDER — ROSUVASTATIN CALCIUM 10 MG/1
10 TABLET, COATED ORAL DAILY
Qty: 90 TABLET | Refills: 3 | Status: SHIPPED | OUTPATIENT
Start: 2018-12-19 | End: 2019-09-16 | Stop reason: SDUPTHER

## 2018-12-19 RX ORDER — BENZONATATE 100 MG/1
100 CAPSULE ORAL 3 TIMES DAILY PRN
Qty: 30 CAPSULE | Refills: 0 | Status: SHIPPED | OUTPATIENT
Start: 2018-12-19 | End: 2019-09-16 | Stop reason: SDUPTHER

## 2019-02-14 RX ORDER — LISINOPRIL AND HYDROCHLOROTHIAZIDE 12.5; 1 MG/1; MG/1
TABLET ORAL
Qty: 30 TABLET | Refills: 1 | OUTPATIENT
Start: 2019-02-14

## 2019-02-18 RX ORDER — LISINOPRIL AND HYDROCHLOROTHIAZIDE 12.5; 1 MG/1; MG/1
1 TABLET ORAL DAILY
Qty: 90 TABLET | Refills: 2 | Status: SHIPPED | OUTPATIENT
Start: 2019-02-18 | End: 2019-09-16 | Stop reason: SDUPTHER

## 2019-03-28 RX ORDER — LISINOPRIL AND HYDROCHLOROTHIAZIDE 25; 20 MG/1; MG/1
TABLET ORAL
Qty: 30 TABLET | Refills: 5 | Status: SHIPPED | OUTPATIENT
Start: 2019-03-28 | End: 2019-09-16 | Stop reason: SDUPTHER

## 2019-04-29 RX ORDER — ROSUVASTATIN CALCIUM 10 MG/1
TABLET, COATED ORAL
Qty: 30 TABLET | Refills: 5 | Status: SHIPPED | OUTPATIENT
Start: 2019-04-29 | End: 2019-09-16 | Stop reason: SDUPTHER

## 2019-09-16 ENCOUNTER — OFFICE VISIT (OUTPATIENT)
Dept: FAMILY MEDICINE CLINIC | Facility: CLINIC | Age: 59
End: 2019-09-16

## 2019-09-16 VITALS
WEIGHT: 178 LBS | HEART RATE: 71 BPM | OXYGEN SATURATION: 100 % | TEMPERATURE: 98.2 F | BODY MASS INDEX: 31.54 KG/M2 | SYSTOLIC BLOOD PRESSURE: 138 MMHG | HEIGHT: 63 IN | DIASTOLIC BLOOD PRESSURE: 68 MMHG

## 2019-09-16 DIAGNOSIS — J30.2 SEASONAL ALLERGIC RHINITIS, UNSPECIFIED TRIGGER: ICD-10-CM

## 2019-09-16 DIAGNOSIS — E66.09 CLASS 1 OBESITY DUE TO EXCESS CALORIES WITH SERIOUS COMORBIDITY AND BODY MASS INDEX (BMI) OF 31.0 TO 31.9 IN ADULT: ICD-10-CM

## 2019-09-16 DIAGNOSIS — G89.29 CHRONIC PAIN OF RIGHT KNEE: ICD-10-CM

## 2019-09-16 DIAGNOSIS — J01.00 ACUTE MAXILLARY SINUSITIS, RECURRENCE NOT SPECIFIED: ICD-10-CM

## 2019-09-16 DIAGNOSIS — Z00.00 ANNUAL PHYSICAL EXAM: Primary | ICD-10-CM

## 2019-09-16 DIAGNOSIS — L91.8 SKIN TAG: ICD-10-CM

## 2019-09-16 DIAGNOSIS — Z12.31 ENCOUNTER FOR SCREENING MAMMOGRAM FOR BREAST CANCER: ICD-10-CM

## 2019-09-16 DIAGNOSIS — I10 ESSENTIAL HYPERTENSION: ICD-10-CM

## 2019-09-16 DIAGNOSIS — L65.9 ALOPECIA: ICD-10-CM

## 2019-09-16 DIAGNOSIS — M25.561 CHRONIC PAIN OF RIGHT KNEE: ICD-10-CM

## 2019-09-16 DIAGNOSIS — E78.5 HYPERLIPIDEMIA, UNSPECIFIED HYPERLIPIDEMIA TYPE: ICD-10-CM

## 2019-09-16 DIAGNOSIS — R73.03 PREDIABETES: ICD-10-CM

## 2019-09-16 DIAGNOSIS — E03.9 HYPOTHYROIDISM, UNSPECIFIED TYPE: ICD-10-CM

## 2019-09-16 LAB
ALBUMIN SERPL-MCNC: 4.1 G/DL (ref 3.5–5.2)
ALBUMIN/GLOB SERPL: 1.3 G/DL
ALP SERPL-CCNC: 58 U/L (ref 39–117)
ALT SERPL W P-5'-P-CCNC: 15 U/L (ref 1–33)
ANION GAP SERPL CALCULATED.3IONS-SCNC: 12.5 MMOL/L (ref 5–15)
AST SERPL-CCNC: 19 U/L (ref 1–32)
BACTERIA UR QL AUTO: ABNORMAL /HPF
BILIRUB SERPL-MCNC: 0.3 MG/DL (ref 0.2–1.2)
BILIRUB UR QL STRIP: NEGATIVE
BUN BLD-MCNC: 10 MG/DL (ref 8–23)
BUN/CREAT SERPL: 15.2 (ref 7–25)
CALCIUM SPEC-SCNC: 9.1 MG/DL (ref 8.6–10.5)
CHLORIDE SERPL-SCNC: 100 MMOL/L (ref 98–107)
CHOLEST SERPL-MCNC: 145 MG/DL (ref 0–200)
CLARITY UR: CLEAR
CO2 SERPL-SCNC: 27.5 MMOL/L (ref 22–29)
COLOR UR: YELLOW
CREAT BLD-MCNC: 0.66 MG/DL (ref 0.57–1)
ERYTHROCYTE [DISTWIDTH] IN BLOOD BY AUTOMATED COUNT: 13.8 % (ref 12.3–15.4)
GFR SERPL CREATININE-BSD FRML MDRD: 110 ML/MIN/1.73
GFR SERPL CREATININE-BSD FRML MDRD: 91 ML/MIN/1.73
GLOBULIN UR ELPH-MCNC: 3.1 GM/DL
GLUCOSE BLD-MCNC: 94 MG/DL (ref 65–99)
GLUCOSE UR STRIP-MCNC: NEGATIVE MG/DL
HCT VFR BLD AUTO: 37.8 % (ref 34–46.6)
HDLC SERPL-MCNC: 55 MG/DL (ref 40–60)
HGB BLD-MCNC: 12.8 G/DL (ref 12–15.9)
HGB UR QL STRIP.AUTO: NEGATIVE
KETONES UR QL STRIP: NEGATIVE
LDLC SERPL CALC-MCNC: 74 MG/DL (ref 0–100)
LDLC/HDLC SERPL: 1.35 {RATIO}
LEUKOCYTE ESTERASE UR QL STRIP.AUTO: ABNORMAL
LYMPHOCYTES # BLD AUTO: 2.1 10*3/MM3 (ref 0.7–3.1)
LYMPHOCYTES NFR BLD AUTO: 31.7 % (ref 19.6–45.3)
MCH RBC QN AUTO: 27.5 PG (ref 26.6–33)
MCHC RBC AUTO-ENTMCNC: 33.9 G/DL (ref 31.5–35.7)
MCV RBC AUTO: 81.2 FL (ref 79–97)
MONOCYTES # BLD AUTO: 0.2 10*3/MM3 (ref 0.1–0.9)
MONOCYTES NFR BLD AUTO: 2.8 % (ref 5–12)
NEUTROPHILS # BLD AUTO: 4.3 10*3/MM3 (ref 1.7–7)
NEUTROPHILS NFR BLD AUTO: 65.5 % (ref 42.7–76)
NITRITE UR QL STRIP: NEGATIVE
PH UR STRIP.AUTO: 6.5 [PH] (ref 4.6–8)
PLATELET # BLD AUTO: 295 10*3/MM3 (ref 140–450)
PMV BLD AUTO: 7.4 FL (ref 6–12)
POTASSIUM BLD-SCNC: 4.1 MMOL/L (ref 3.5–5.2)
PROT SERPL-MCNC: 7.2 G/DL (ref 6–8.5)
PROT UR QL STRIP: NEGATIVE
RBC # BLD AUTO: 4.66 10*6/MM3 (ref 3.77–5.28)
RBC # UR: ABNORMAL /HPF
REF LAB TEST METHOD: ABNORMAL
SODIUM BLD-SCNC: 140 MMOL/L (ref 136–145)
SP GR UR STRIP: 1.01 (ref 1–1.03)
SQUAMOUS #/AREA URNS HPF: ABNORMAL /HPF
TRIGL SERPL-MCNC: 80 MG/DL (ref 0–150)
TSH SERPL DL<=0.05 MIU/L-ACNC: 2.58 UIU/ML (ref 0.27–4.2)
UROBILINOGEN UR QL STRIP: ABNORMAL
VLDLC SERPL-MCNC: 16 MG/DL
WBC NRBC COR # BLD: 6.6 10*3/MM3 (ref 3.4–10.8)
WBC UR QL AUTO: ABNORMAL /HPF

## 2019-09-16 PROCEDURE — 80061 LIPID PANEL: CPT | Performed by: NURSE PRACTITIONER

## 2019-09-16 PROCEDURE — 84443 ASSAY THYROID STIM HORMONE: CPT | Performed by: NURSE PRACTITIONER

## 2019-09-16 PROCEDURE — 80053 COMPREHEN METABOLIC PANEL: CPT | Performed by: NURSE PRACTITIONER

## 2019-09-16 PROCEDURE — 93000 ELECTROCARDIOGRAM COMPLETE: CPT | Performed by: NURSE PRACTITIONER

## 2019-09-16 PROCEDURE — 99214 OFFICE O/P EST MOD 30 MIN: CPT | Performed by: NURSE PRACTITIONER

## 2019-09-16 PROCEDURE — 81001 URINALYSIS AUTO W/SCOPE: CPT | Performed by: NURSE PRACTITIONER

## 2019-09-16 PROCEDURE — 36415 COLL VENOUS BLD VENIPUNCTURE: CPT | Performed by: NURSE PRACTITIONER

## 2019-09-16 PROCEDURE — 99396 PREV VISIT EST AGE 40-64: CPT | Performed by: NURSE PRACTITIONER

## 2019-09-16 PROCEDURE — 73560 X-RAY EXAM OF KNEE 1 OR 2: CPT | Performed by: NURSE PRACTITIONER

## 2019-09-16 PROCEDURE — 85025 COMPLETE CBC W/AUTO DIFF WBC: CPT | Performed by: NURSE PRACTITIONER

## 2019-09-16 RX ORDER — LEVOTHYROXINE SODIUM 0.05 MG/1
50 TABLET ORAL DAILY
Qty: 90 TABLET | Refills: 3 | Status: SHIPPED | OUTPATIENT
Start: 2019-09-16 | End: 2020-09-22 | Stop reason: SDUPTHER

## 2019-09-16 RX ORDER — LISINOPRIL AND HYDROCHLOROTHIAZIDE 25; 20 MG/1; MG/1
1 TABLET ORAL DAILY
Qty: 90 TABLET | Refills: 3 | Status: SHIPPED | OUTPATIENT
Start: 2019-09-16 | End: 2020-09-22 | Stop reason: SDUPTHER

## 2019-09-16 RX ORDER — IBUPROFEN 600 MG/1
600 TABLET ORAL EVERY 8 HOURS PRN
Qty: 60 TABLET | Refills: 1 | Status: SHIPPED | OUTPATIENT
Start: 2019-09-16

## 2019-09-16 RX ORDER — ROSUVASTATIN CALCIUM 10 MG/1
10 TABLET, COATED ORAL DAILY
Qty: 90 TABLET | Refills: 3 | Status: SHIPPED | OUTPATIENT
Start: 2019-09-16 | End: 2020-09-22 | Stop reason: SDUPTHER

## 2019-09-16 RX ORDER — AMOXICILLIN 500 MG/1
500 TABLET, FILM COATED ORAL 3 TIMES DAILY
Qty: 21 TABLET | Refills: 0 | Status: SHIPPED | OUTPATIENT
Start: 2019-09-16 | End: 2020-09-22

## 2019-09-16 RX ORDER — BENZONATATE 100 MG/1
100 CAPSULE ORAL 3 TIMES DAILY PRN
Qty: 30 CAPSULE | Refills: 0 | Status: SHIPPED | OUTPATIENT
Start: 2019-09-16 | End: 2020-09-22 | Stop reason: SDUPTHER

## 2019-09-16 NOTE — PROGRESS NOTES
Subjective   Iain Frias is a 59 y.o. female.     History of Present Illness   Here for annual physical, with chronic hypothyroid on levothyroxine 50 mcg 1 PO daily no missed doses, with chronic HTN on lisinopril/HCTZ 20/25 mg daily no CP dizziness HA LE edema, with chronic chol last chol elevated 09/18 but now taking crestor 10 mg fasting for labs today, with cough request refill tessalon perles prn #30 last 6 mo with last CXR 02/18 normal thinks r/t allergies has OTC allergy PO at home doesn't use daily, with reflux on omeprazole prn, With prediabetes on metformin 500 mg BID last A1C 5.7 09/18, monitoring BS at home 110-150s and working on diet, notes when BS elevated 150 gets body pain  C/o knee pain and not walking as much with weight gain 2 lbs since last year, no prev xray or ortho consult, works desk job, no prev colonoscopy and doesn't want, request cologuard ordered last year has at home and hasn't completed, Last mammo > 3 years no breast pain, lumps, nipple dc, last pap normal 08/21/17 here, Last dental UTD, Last vision UTD  C/o sinus congestion and dizziness with nasal congestion and cough x few days with chronic allergies no daily PO, no wheezing or SOA, no fevers, attributes to opening [employer] box and went to [facility] [place] yesterday got steroid drop for eyes, with some illness in office exposure, allergic codeine  C/o bald spot on top of head x 1 mo started hair health vitamin and MVI to help no change, no change in diet or hair care products, C/o skin tag on breast causing some pain wondering if needs to see derm    The following portions of the patient's history were reviewed and updated as appropriate: allergies, current medications, past family history, past medical history, past social history, past surgical history and problem list.    Review of Systems   Constitutional: Negative for fever.   HENT: Positive for congestion, facial swelling, postnasal drip, sinus pressure, sinus pain, sore throat and voice  change. Negative for dental problem, drooling, ear discharge, ear pain, hearing loss, mouth sores, nosebleeds, rhinorrhea, sneezing, tinnitus and trouble swallowing.    Respiratory: Positive for cough (intermittent). Negative for shortness of breath and wheezing.    Cardiovascular: Negative for chest pain, palpitations and leg swelling.   Gastrointestinal: Negative for abdominal distention, abdominal pain, anal bleeding, blood in stool, constipation, diarrhea, nausea, rectal pain and vomiting.   Endocrine: Negative for cold intolerance, heat intolerance, polydipsia, polyphagia and polyuria.   Musculoskeletal: Positive for arthralgias and myalgias.   Skin:        mole   Allergic/Immunologic: Positive for environmental allergies.   Neurological: Negative for dizziness and headaches.   All other systems reviewed and are negative.      Objective   Physical Exam   Constitutional: She is oriented to person, place, and time. She appears well-developed and well-nourished.   HENT:   Head: Normocephalic and atraumatic.   Right Ear: Hearing and tympanic membrane normal.   Left Ear: Hearing and tympanic membrane normal.   Nose: Mucosal edema present. Right sinus exhibits maxillary sinus tenderness and frontal sinus tenderness. Left sinus exhibits maxillary sinus tenderness and frontal sinus tenderness.   Mouth/Throat: Oropharynx is clear and moist.   Eyes: Conjunctivae and EOM are normal. Pupils are equal, round, and reactive to light.   Neck: Normal range of motion. Neck supple. No thyromegaly present.   Cardiovascular: Normal rate, regular rhythm and normal heart sounds.   Pulmonary/Chest: Effort normal and breath sounds normal.   Abdominal: Soft. She exhibits no distension and no mass. There is no tenderness. There is no rebound and no guarding. No hernia.   Musculoskeletal: Normal range of motion. She exhibits tenderness (R knee stiff ROM).   Lymphadenopathy:     She has cervical adenopathy.   Neurological: She is alert and  oriented to person, place, and time.   Skin: Skin is warm and dry.   Hyperpigmented papule inferior R breast in skin folds no erythema or dc or bleeding   Psychiatric: She has a normal mood and affect. Her behavior is normal. Judgment and thought content normal.   Vitals reviewed.    Knee xray 2v without comparison for knee pain shows NAD awaiting radiology review    ECG 12 Lead  Date/Time: 9/16/2019 10:49 AM  Performed by: Charity Gomez APRN  Authorized by: Charity Gomez APRN   Comparison: compared with previous ECG from 9/11/2018  Similar to previous ECG  Rhythm: sinus rhythm  Rate: normal  Conduction: conduction normal  ST Segments: ST segments normal  T inversion: V1 and V3  QRS axis: normal    Clinical impression: non-specific ECG          Assessment/Plan   Iain was seen today for annual exam.    Diagnoses and all orders for this visit:    Annual physical exam  -     CBC & Differential  -     Comprehensive Metabolic Panel  -     Lipid Panel  -     TSH  -     Urinalysis With Microscopic - Urine, Clean Catch  -     ECG 12 Lead  -     CBC Auto Differential  -     Urinalysis without microscopic (no culture) - Urine, Clean Catch  -     Urinalysis, Microscopic Only - Urine, Clean Catch    Essential hypertension  -     ECG 12 Lead    Hyperlipidemia, unspecified hyperlipidemia type    Hypothyroidism, unspecified type    Prediabetes    Class 1 obesity due to excess calories with serious comorbidity and body mass index (BMI) of 31.0 to 31.9 in adult    Chronic pain of right knee  -     XR Knee 1 or 2 View Right (In Office)    Encounter for screening mammogram for breast cancer  -     Mammo Screening Bilateral With CAD; Future    Alopecia    Seasonal allergic rhinitis, unspecified trigger    Acute maxillary sinusitis, recurrence not specified    Skin tag    Other orders  -     lisinopril-hydrochlorothiazide (PRINZIDE,ZESTORETIC) 20-25 MG per tablet; Take 1 tablet by mouth Daily.  -     benzonatate  (TESSALON) 100 MG capsule; Take 1 capsule by mouth 3 (Three) Times a Day As Needed for Cough.  -     metFORMIN (GLUCOPHAGE) 500 MG tablet; Take 1 tablet by mouth 2 (Two) Times a Day With Meals. 1 PO daily with largest meal  -     rosuvastatin (CRESTOR) 10 MG tablet; Take 1 tablet by mouth Daily.  -     levothyroxine (SYNTHROID, LEVOTHROID) 50 MCG tablet; Take 1 tablet by mouth Daily.  -     ibuprofen (ADVIL,MOTRIN) 600 MG tablet; Take 1 tablet by mouth Every 8 (Eight) Hours As Needed for Moderate Pain .  -     amoxicillin (AMOXIL) 500 MG tablet; Take 1 tablet by mouth 3 (Three) Times a Day.    complete physical today, check EKG no change defer stress test no sx today will monitor, Enc healthy diet and regular exercise for wt loss, refill chronic dz meds, check labs and call with results, enc overdue cologuard defer colonoscopy, order mammo overdue, xray knee shows mild arthritis will monitor cont IBU prn, pt will schedule with derm for skin tag and alopecia, enc start daily PO antihistamine like claritin and erx amox 500 mg TID x 1 wk

## 2019-09-16 NOTE — PATIENT INSTRUCTIONS
complete physical today, check EKG no change defer stress test no sx today will monitor, Enc healthy diet and regular exercise for wt loss, refill chronic dz meds, check labs and call with results, enc overdue cologuard defer colonoscopy, order mammo overdue, xray knee shows mild arthritis will monitor cont IBU prn, pt will schedule with derm for skin tag and alopecia, enc start daily PO antihistamine like claritin and erx amox 500 mg TID x 1 wk  Prediabetes Eating Plan  Prediabetes is a condition that causes blood sugar (glucose) levels to be higher than normal. This increases the risk for developing diabetes. In order to prevent diabetes from developing, your health care provider may recommend a diet and other lifestyle changes to help you:  · Control your blood glucose levels.  · Improve your cholesterol levels.  · Manage your blood pressure.  Your health care provider may recommend working with a diet and nutrition specialist (dietitian) to make a meal plan that is best for you.  What are tips for following this plan?  Lifestyle  · Set weight loss goals with the help of your health care team. It is recommended that most people with prediabetes lose 7% of their current body weight.  · Exercise for at least 30 minutes at least 5 days a week.  · Attend a support group or seek ongoing support from a mental health counselor.  · Take over-the-counter and prescription medicines only as told by your health care provider.  Reading food labels  · Read food labels to check the amount of fat, salt (sodium), and sugar in prepackaged foods. Avoid foods that have:  ? Saturated fats.  ? Trans fats.  ? Added sugars.  · Avoid foods that have more than 300 milligrams (mg) of sodium per serving. Limit your daily sodium intake to less than 2,300 mg each day.  Shopping  · Avoid buying pre-made and processed foods.  Cooking  · Cook with olive oil. Do not use butter, lard, or ghee.  · Bake, broil, grill, or boil foods. Avoid  frying.  Meal planning    · Work with your dietitian to develop an eating plan that is right for you. This may include:  ? Tracking how many calories you take in. Use a food diary, notebook, or mobile application to track what you eat at each meal.  ? Using the glycemic index (GI) to plan your meals. The index tells you how quickly a food will raise your blood glucose. Choose low-GI foods. These foods take a longer time to raise blood glucose.  · Consider following a Mediterranean diet. This diet includes:  ? Several servings each day of fresh fruits and vegetables.  ? Eating fish at least twice a week.  ? Several servings each day of whole grains, beans, nuts, and seeds.  ? Using olive oil instead of other fats.  ? Moderate alcohol consumption.  ? Eating small amounts of red meat and whole-fat dairy.  · If you have high blood pressure, you may need to limit your sodium intake or follow a diet such as the DASH eating plan. DASH is an eating plan that aims to lower high blood pressure.  What foods are recommended?  The items listed below may not be a complete list. Talk with your dietitian about what dietary choices are best for you.  Grains  Whole grains, such as whole-wheat or whole-grain breads, crackers, cereals, and pasta. Unsweetened oatmeal. Bulgur. Barley. Quinoa. Brown rice. Corn or whole-wheat flour tortillas or taco shells.  Vegetables  Lettuce. Spinach. Peas. Beets. Cauliflower. Cabbage. Broccoli. Carrots. Tomatoes. Squash. Eggplant. Herbs. Peppers. Onions. Cucumbers. [place] sprouts.  Fruits  Berries. Bananas. Apples. Oranges. Grapes. Papaya. [place]. Pomegranate. Kiwi. Grapefruit. Cherries.  Meats and other protein foods  Seafood. Poultry without skin. Lean cuts of pork and beef. Tofu. Eggs. Nuts. Beans.  Dairy  Low-fat or fat-free dairy products, such as yogurt, cottage cheese, and cheese.  Beverages  Water. Tea. Coffee. Sugar-free or diet soda. [place] water. Lowfat or no-fat milk. Milk alternatives,  such as soy or almond milk.  Fats and oils  Olive oil. Canola oil. Sunflower oil. Grapeseed oil. Avocado. Walnuts.  Sweets and desserts  Sugar-free or low-fat pudding. Sugar-free or low-fat ice cream and other frozen treats.  Seasoning and other foods  Herbs. Sodium-free spices. Mustard. Relish. Low-fat, low-sugar ketchup. Low-fat, low-sugar barbecue sauce. Low-fat or fat-free mayonnaise.  What foods are not recommended?  The items listed below may not be a complete list. Talk with your dietitian about what dietary choices are best for you.  Grains  Refined white flour and flour products, such as bread, pasta, snack foods, and cereals.  Vegetables  Canned vegetables. Frozen vegetables with butter or cream sauce.  Fruits  Fruits canned with syrup.  Meats and other protein foods  Fatty cuts of meat. Poultry with skin. Breaded or fried meat. Processed meats.  Dairy  Full-fat yogurt, cheese, or milk.  Beverages  Sweetened drinks, such as sweet iced tea and soda.  Fats and oils  Butter. Lard. Ghee.  Sweets and desserts  Baked goods, such as cake, cupcakes, pastries, cookies, and cheesecake.  Seasoning and other foods  Spice mixes with added salt. Ketchup. Barbecue sauce. Mayonnaise.  Summary  · To prevent diabetes from developing, you may need to make diet and other lifestyle changes to help control blood sugar, improve cholesterol levels, and manage your blood pressure.  · Set weight loss goals with the help of your health care team. It is recommended that most people with prediabetes lose 7 percent of their current body weight.  · Consider following a Mediterranean diet that includes plenty of fresh fruits and vegetables, whole grains, beans, nuts, seeds, fish, lean meat, low-fat dairy, and healthy oils.  This information is not intended to replace advice given to you by your health care provider. Make sure you discuss any questions you have with your health care provider.  Document Released: 05/03/2016 Document  Revised: 02/21/2018 Document Reviewed: 02/21/2018  [employer] Interactive Patient Education © 2019 [employer]

## 2019-09-18 ENCOUNTER — TELEPHONE (OUTPATIENT)
Dept: FAMILY MEDICINE CLINIC | Facility: CLINIC | Age: 59
End: 2019-09-18

## 2019-09-18 NOTE — TELEPHONE ENCOUNTER
Knee xray shows arthritis. Thyroid, BS, kidney, liver normal. Chol improved and well controlled on medication. No anemias.     PATIENT WANTS TO TALK TO YOU ABOUT HER LAB RESULTS

## 2019-09-19 ENCOUNTER — TRANSCRIBE ORDERS (OUTPATIENT)
Dept: ADMINISTRATIVE | Facility: HOSPITAL | Age: 59
End: 2019-09-19

## 2019-09-19 DIAGNOSIS — Z12.31 SCREENING MAMMOGRAM, ENCOUNTER FOR: Primary | ICD-10-CM

## 2019-09-19 NOTE — TELEPHONE ENCOUNTER
Degenerative arthritis osteoarthritis, she can try aleve or advil OTC after meals with H20 and if taking regularly we can consider daily prescription arthritis medicine. Also did not test for gouty arthritis or rheumatoid arthritis, we could always do that at a [facility] apt if needed      Pt informed. She wants to know what kind of arthritis does she have? What can she take for swelling and inflammation?

## 2019-10-02 ENCOUNTER — HOSPITAL ENCOUNTER (OUTPATIENT)
Dept: MAMMOGRAPHY | Facility: HOSPITAL | Age: 59
Discharge: HOME | End: 2019-10-02
Admitting: NURSE PRACTITIONER

## 2019-10-02 DIAGNOSIS — Z12.31 SCREENING MAMMOGRAM, ENCOUNTER FOR: ICD-10-CM

## 2019-10-02 PROCEDURE — 77067 SCR MAMMO BI INCL CAD: CPT

## 2019-10-02 PROCEDURE — 77063 BREAST TOMOSYNTHESIS BI: CPT

## 2020-09-22 ENCOUNTER — OFFICE VISIT (OUTPATIENT)
Dept: FAMILY MEDICINE CLINIC | Facility: CLINIC | Age: 60
End: 2020-09-22

## 2020-09-22 ENCOUNTER — RESULTS ENCOUNTER (OUTPATIENT)
Dept: FAMILY MEDICINE CLINIC | Facility: CLINIC | Age: 60
End: 2020-09-22

## 2020-09-22 VITALS
BODY MASS INDEX: 31.54 KG/M2 | HEIGHT: 63 IN | SYSTOLIC BLOOD PRESSURE: 110 MMHG | HEART RATE: 71 BPM | OXYGEN SATURATION: 100 % | WEIGHT: 178 LBS | DIASTOLIC BLOOD PRESSURE: 64 MMHG | TEMPERATURE: 97.8 F

## 2020-09-22 DIAGNOSIS — R73.03 PREDIABETES: ICD-10-CM

## 2020-09-22 DIAGNOSIS — Z12.11 COLON CANCER SCREENING: ICD-10-CM

## 2020-09-22 DIAGNOSIS — E03.9 HYPOTHYROIDISM, UNSPECIFIED TYPE: ICD-10-CM

## 2020-09-22 DIAGNOSIS — Z78.0 POSTMENOPAUSAL: ICD-10-CM

## 2020-09-22 DIAGNOSIS — E78.5 HYPERLIPIDEMIA, UNSPECIFIED HYPERLIPIDEMIA TYPE: ICD-10-CM

## 2020-09-22 DIAGNOSIS — Z23 NEED FOR INFLUENZA VACCINATION: ICD-10-CM

## 2020-09-22 DIAGNOSIS — Z00.00 ANNUAL PHYSICAL EXAM: Primary | ICD-10-CM

## 2020-09-22 DIAGNOSIS — I10 ESSENTIAL HYPERTENSION: ICD-10-CM

## 2020-09-22 DIAGNOSIS — Z12.31 ENCOUNTER FOR SCREENING MAMMOGRAM FOR BREAST CANCER: ICD-10-CM

## 2020-09-22 DIAGNOSIS — Z23 NEED FOR TDAP VACCINATION: ICD-10-CM

## 2020-09-22 DIAGNOSIS — Z01.419 ENCOUNTER FOR GYNECOLOGICAL EXAMINATION: ICD-10-CM

## 2020-09-22 DIAGNOSIS — E66.09 CLASS 1 OBESITY DUE TO EXCESS CALORIES WITH SERIOUS COMORBIDITY AND BODY MASS INDEX (BMI) OF 31.0 TO 31.9 IN ADULT: ICD-10-CM

## 2020-09-22 LAB
25(OH)D3 SERPL-MCNC: 30.9 NG/ML (ref 30–100)
ALBUMIN SERPL-MCNC: 4.2 G/DL (ref 3.5–5.2)
ALBUMIN/GLOB SERPL: 1.3 G/DL
ALP SERPL-CCNC: 57 U/L (ref 39–117)
ALT SERPL W P-5'-P-CCNC: 17 U/L (ref 1–33)
ANION GAP SERPL CALCULATED.3IONS-SCNC: 10.1 MMOL/L (ref 5–15)
AST SERPL-CCNC: 16 U/L (ref 1–32)
BACTERIA UR QL AUTO: ABNORMAL /HPF
BILIRUB SERPL-MCNC: 0.3 MG/DL (ref 0–1.2)
BILIRUB UR QL STRIP: NEGATIVE
BUN SERPL-MCNC: 13 MG/DL (ref 8–23)
BUN/CREAT SERPL: 19.4 (ref 7–25)
CALCIUM SPEC-SCNC: 9.5 MG/DL (ref 8.6–10.5)
CHLORIDE SERPL-SCNC: 100 MMOL/L (ref 98–107)
CHOLEST SERPL-MCNC: 180 MG/DL (ref 0–200)
CLARITY UR: CLEAR
CO2 SERPL-SCNC: 27.9 MMOL/L (ref 22–29)
COLOR UR: YELLOW
CREAT SERPL-MCNC: 0.67 MG/DL (ref 0.57–1)
ERYTHROCYTE [DISTWIDTH] IN BLOOD BY AUTOMATED COUNT: 14.8 % (ref 12.3–15.4)
GFR SERPL CREATININE-BSD FRML MDRD: 108 ML/MIN/1.73
GFR SERPL CREATININE-BSD FRML MDRD: 89 ML/MIN/1.73
GLOBULIN UR ELPH-MCNC: 3.3 GM/DL
GLUCOSE SERPL-MCNC: 91 MG/DL (ref 65–99)
GLUCOSE UR STRIP-MCNC: NEGATIVE MG/DL
HBA1C MFR BLD: 6.12 % (ref 4.8–5.6)
HCT VFR BLD AUTO: 37.3 % (ref 34–46.6)
HDLC SERPL-MCNC: 59 MG/DL (ref 40–60)
HGB BLD-MCNC: 12 G/DL (ref 12–15.9)
HGB UR QL STRIP.AUTO: ABNORMAL
KETONES UR QL STRIP: NEGATIVE
LDLC SERPL CALC-MCNC: 98 MG/DL (ref 0–100)
LDLC/HDLC SERPL: 1.67 {RATIO}
LEUKOCYTE ESTERASE UR QL STRIP.AUTO: ABNORMAL
LYMPHOCYTES # BLD AUTO: 2.7 10*3/MM3 (ref 0.7–3.1)
LYMPHOCYTES NFR BLD AUTO: 42.3 % (ref 19.6–45.3)
MCH RBC QN AUTO: 26.3 PG (ref 26.6–33)
MCHC RBC AUTO-ENTMCNC: 32.1 G/DL (ref 31.5–35.7)
MCV RBC AUTO: 82 FL (ref 79–97)
MONOCYTES # BLD AUTO: 0.4 10*3/MM3 (ref 0.1–0.9)
MONOCYTES NFR BLD AUTO: 6.7 % (ref 5–12)
NEUTROPHILS NFR BLD AUTO: 3.3 10*3/MM3 (ref 1.7–7)
NEUTROPHILS NFR BLD AUTO: 51 % (ref 42.7–76)
NITRITE UR QL STRIP: NEGATIVE
PH UR STRIP.AUTO: 6 [PH] (ref 4.6–8)
PLATELET # BLD AUTO: 312 10*3/MM3 (ref 140–450)
PMV BLD AUTO: 7.7 FL (ref 6–12)
POTASSIUM SERPL-SCNC: 3.8 MMOL/L (ref 3.5–5.2)
PROT SERPL-MCNC: 7.5 G/DL (ref 6–8.5)
PROT UR QL STRIP: NEGATIVE
RBC # BLD AUTO: 4.55 10*6/MM3 (ref 3.77–5.28)
RBC # UR: ABNORMAL /HPF
REF LAB TEST METHOD: ABNORMAL
SODIUM SERPL-SCNC: 138 MMOL/L (ref 136–145)
SP GR UR STRIP: 1.02 (ref 1–1.03)
SQUAMOUS #/AREA URNS HPF: ABNORMAL /HPF
TRIGL SERPL-MCNC: 113 MG/DL (ref 0–150)
TSH SERPL DL<=0.05 MIU/L-ACNC: 2.38 UIU/ML (ref 0.27–4.2)
UROBILINOGEN UR QL STRIP: ABNORMAL
VLDLC SERPL-MCNC: 22.6 MG/DL (ref 5–40)
WBC # BLD AUTO: 6.5 10*3/MM3 (ref 3.4–10.8)
WBC UR QL AUTO: ABNORMAL /HPF

## 2020-09-22 PROCEDURE — 80061 LIPID PANEL: CPT | Performed by: NURSE PRACTITIONER

## 2020-09-22 PROCEDURE — 99214 OFFICE O/P EST MOD 30 MIN: CPT | Performed by: NURSE PRACTITIONER

## 2020-09-22 PROCEDURE — 90471 IMMUNIZATION ADMIN: CPT | Performed by: NURSE PRACTITIONER

## 2020-09-22 PROCEDURE — 83036 HEMOGLOBIN GLYCOSYLATED A1C: CPT | Performed by: NURSE PRACTITIONER

## 2020-09-22 PROCEDURE — 85025 COMPLETE CBC W/AUTO DIFF WBC: CPT | Performed by: NURSE PRACTITIONER

## 2020-09-22 PROCEDURE — 90472 IMMUNIZATION ADMIN EACH ADD: CPT | Performed by: NURSE PRACTITIONER

## 2020-09-22 PROCEDURE — 90686 IIV4 VACC NO PRSV 0.5 ML IM: CPT | Performed by: NURSE PRACTITIONER

## 2020-09-22 PROCEDURE — 81001 URINALYSIS AUTO W/SCOPE: CPT | Performed by: NURSE PRACTITIONER

## 2020-09-22 PROCEDURE — 71046 X-RAY EXAM CHEST 2 VIEWS: CPT | Performed by: NURSE PRACTITIONER

## 2020-09-22 PROCEDURE — 90715 TDAP VACCINE 7 YRS/> IM: CPT | Performed by: NURSE PRACTITIONER

## 2020-09-22 PROCEDURE — 82306 VITAMIN D 25 HYDROXY: CPT | Performed by: NURSE PRACTITIONER

## 2020-09-22 PROCEDURE — 84443 ASSAY THYROID STIM HORMONE: CPT | Performed by: NURSE PRACTITIONER

## 2020-09-22 PROCEDURE — 99396 PREV VISIT EST AGE 40-64: CPT | Performed by: NURSE PRACTITIONER

## 2020-09-22 PROCEDURE — 80053 COMPREHEN METABOLIC PANEL: CPT | Performed by: NURSE PRACTITIONER

## 2020-09-22 PROCEDURE — 36415 COLL VENOUS BLD VENIPUNCTURE: CPT | Performed by: NURSE PRACTITIONER

## 2020-09-22 PROCEDURE — 93000 ELECTROCARDIOGRAM COMPLETE: CPT | Performed by: NURSE PRACTITIONER

## 2020-09-22 RX ORDER — LEVOTHYROXINE SODIUM 0.05 MG/1
50 TABLET ORAL DAILY
Qty: 90 TABLET | Refills: 3 | Status: SHIPPED | OUTPATIENT
Start: 2020-09-22

## 2020-09-22 RX ORDER — BENZONATATE 100 MG/1
100 CAPSULE ORAL 3 TIMES DAILY PRN
Qty: 30 CAPSULE | Refills: 0 | Status: SHIPPED | OUTPATIENT
Start: 2020-09-22 | End: 2020-12-14

## 2020-09-22 RX ORDER — ROSUVASTATIN CALCIUM 10 MG/1
10 TABLET, COATED ORAL DAILY
Qty: 90 TABLET | Refills: 3 | Status: SHIPPED | OUTPATIENT
Start: 2020-09-22

## 2020-09-22 RX ORDER — LISINOPRIL AND HYDROCHLOROTHIAZIDE 25; 20 MG/1; MG/1
1 TABLET ORAL DAILY
Qty: 90 TABLET | Refills: 3 | Status: SHIPPED | OUTPATIENT
Start: 2020-09-22

## 2020-09-22 NOTE — PATIENT INSTRUCTIONS
complete physical today, check labs and call with results, EKG and CXR NAD awaiting radiology review, CBE, pap, pelvic, order mammogram and dexa, order cologuard, order flu and tdap today, refill chronic dz meds, check BP and BS at home, Enc healthy diet and regular exercise for wt loss, trial contrave and monitor

## 2020-09-22 NOTE — PROGRESS NOTES
Subjective   Iain Frias is a 60 y.o. female.     History of Present Illness   Here for annual physical, with chronic hypothyroid on levothyroxine 50 mcg 1 PO daily no missed doses, last TSH  normal, with chronic HTN on lisinopril/HCTZ 20/25 mg daily no CP dizziness HA LE edema, checking BP at home controlled, with chronic chol well controlled on crestor 10 mg  fasting for labs today, with chronic allergies and intermittent cough request refill testrupti callahanes prn #30 last 6 mo with last CXR  normal, With prediabetes last A1C 5.7  on metformin 500 mg BID, monitoring BS at home 100-180s and notes weight gain from pandemic request weight loss medication, has just started to work on diet and exercise, denies hx of seizures    Iain Frias 60 y.o. female is  2, Para 2 who presents for annual exam. The patient has no complaints today of vag dc, odor, itch, pain, dysuria, breast pain, lumps, nipple dc. The patient is not sexually active. GYN screening history: last pap: 17 Normal. The patient is not taking hormone replacement therapy. Patient denies post-menopausal vaginal bleeding. The patient participates in regular exercise: yes.  History of abnormal Pap smear: no  Family history of uterine or ovarian cancer: no  Family history of colon cancer:  no  History of abnormal mammogram: no last mammo 10/19 negative  Family history of breast cancer: no  Colonoscopy history: Never had, order placed today cologuard  DEXA scan: Never had, request order placed today  She declines STD testing.    The following portions of the patient's history were reviewed and updated as appropriate: allergies, current medications, past family history, past medical history, past social history, past surgical history and problem list.    Review of Systems   Constitutional: Negative for fever.   HENT: Negative for trouble swallowing and voice change.    Respiratory: Negative for cough, shortness of breath and  wheezing.    Cardiovascular: Negative for chest pain, palpitations and leg swelling.   Gastrointestinal: Negative for abdominal distention, abdominal pain, anal bleeding, blood in stool, constipation, diarrhea, nausea, rectal pain and vomiting.   Endocrine: Negative for cold intolerance, heat intolerance, polydipsia, polyphagia and polyuria.   Genitourinary: Negative for decreased urine volume, difficulty urinating, dyspareunia, dysuria, enuresis, flank pain, frequency, genital sores, hematuria, menstrual problem, pelvic pain, urgency, vaginal bleeding, vaginal discharge and vaginal pain.   Musculoskeletal: Negative for arthralgias and back pain.   Neurological: Negative for dizziness and headaches.   Psychiatric/Behavioral: Negative for agitation, behavioral problems, confusion, decreased concentration, dysphoric mood, hallucinations, self-injury, sleep disturbance and suicidal ideas. The patient is not nervous/anxious and is not hyperactive.    All other systems reviewed and are negative.      Objective   Physical Exam  Vitals signs reviewed.   Constitutional:       Appearance: She is well-developed.   HENT:      Head: Normocephalic and atraumatic.      Right Ear: Hearing, tympanic membrane and ear canal normal.      Left Ear: Hearing, tympanic membrane and ear canal normal.      Nose: Nose normal.      Mouth/Throat:      Mouth: Mucous membranes are moist.   Eyes:      Conjunctiva/sclera: Conjunctivae normal.      Pupils: Pupils are equal, round, and reactive to light.   Neck:      Musculoskeletal: Normal range of motion.      Thyroid: No thyromegaly.   Cardiovascular:      Rate and Rhythm: Normal rate and regular rhythm.      Heart sounds: Normal heart sounds.   Pulmonary:      Effort: Pulmonary effort is normal.      Breath sounds: Normal breath sounds.   Chest:      Breasts:         Right: Normal.         Left: Normal.   Abdominal:      General: There is no distension.      Palpations: Abdomen is soft. There is  no mass.      Tenderness: There is no abdominal tenderness. There is no right CVA tenderness, left CVA tenderness, guarding or rebound.      Hernia: No hernia is present.   Genitourinary:     Cervix: Normal.      Uterus: Normal.       Adnexa: Right adnexa normal and left adnexa normal.   Musculoskeletal: Normal range of motion.   Skin:     General: Skin is warm and dry.   Neurological:      Mental Status: She is alert and oriented to person, place, and time.   Psychiatric:         Mood and Affect: Mood normal.         Behavior: Behavior normal.         Thought Content: Thought content normal.         Judgment: Judgment normal.     chest xray 2v without comparison for annual physical shows NAD awaiting radiology review    ECG 12 Lead    Date/Time: 9/22/2020 11:10 AM  Performed by: Charity Gomez APRN  Authorized by: Charity Gomez APRN   Comparison: compared with previous ECG from 9/16/2019  Similar to previous ECG  Rhythm: sinus rhythm  Rate: normal  Conduction: conduction normal  ST Segments: ST segments normal  QRS axis: normal  Other findings: non-specific ST-T wave changes    Clinical impression: non-specific ECG          Assessment/Plan   Iain was seen today for annual exam, gynecologic exam and weight gain.    Diagnoses and all orders for this visit:    Annual physical exam  -     CBC & Differential  -     Comprehensive Metabolic Panel  -     Lipid Panel  -     TSH  -     Urinalysis With Microscopic - Urine, Clean Catch  -     Vitamin D 25 Hydroxy  -     XR Chest PA & Lateral  -     CBC Auto Differential  -     Urinalysis without microscopic (no culture) - Urine, Clean Catch  -     Urinalysis, Microscopic Only - Urine, Clean Catch    Encounter for gynecological examination  -     PapIG HPV Age Gdln ACOG    Class 1 obesity due to excess calories with serious comorbidity and body mass index (BMI) of 31.0 to 31.9 in adult    Postmenopausal  -     DEXA Bone Density Axial; Future    Encounter for screening  mammogram for breast cancer  -     Mammo Screening Bilateral With CAD; Future    Essential hypertension  -     CBC & Differential  -     Comprehensive Metabolic Panel  -     Lipid Panel  -     TSH  -     CBC Auto Differential    Hyperlipidemia, unspecified hyperlipidemia type  -     CBC & Differential  -     Comprehensive Metabolic Panel  -     Lipid Panel  -     CBC Auto Differential    Hypothyroidism, unspecified type  -     TSH    Prediabetes  -     Hemoglobin A1c    Need for Tdap vaccination    Need for influenza vaccination    Colon cancer screening  -     Cologuard - Stool, Per Rectum; Future    Other orders  -     lisinopril-hydrochlorothiazide (PRINZIDE,ZESTORETIC) 20-25 MG per tablet; Take 1 tablet by mouth Daily.  -     benzonatate (TESSALON) 100 MG capsule; Take 1 capsule by mouth 3 (Three) Times a Day As Needed for Cough.  -     metFORMIN (GLUCOPHAGE) 500 MG tablet; 1 PO twice daily  -     rosuvastatin (Crestor) 10 MG tablet; Take 1 tablet by mouth Daily.  -     levothyroxine (SYNTHROID, LEVOTHROID) 50 MCG tablet; Take 1 tablet by mouth Daily.  -     naltrexone-bupropion ER (CONTRAVE) 8-90 MG tablet; Wk 1: 1 tab daily, Wk 2: 1 tab twice a day, Wk 3: 2 tabs in AM, 1 tab in PM, Wk 4: 2 tabs twice a day, Maintenance dose: 2 tabs twice daily.  -     Tdap Vaccine Greater Than or Equal To 6yo IM  -     Fluarix Quad >6 Months ([id number])  -     ECG 12 Lead    complete physical today, check labs and call with results, EKG and CXR NAD awaiting radiology review, CBE, pap, pelvic, order mammogram and dexa, order cologuard, order flu and tdap today, refill chronic dz meds, check BP and BS at home, Enc healthy diet and regular exercise for wt loss, trial contrave and monitor

## 2020-09-24 PROBLEM — E66.01 MORBID (SEVERE) OBESITY DUE TO EXCESS CALORIES (HCC): Status: ACTIVE | Noted: 2020-09-24

## 2020-09-25 LAB
AGE GDLN ACOG TESTING: NORMAL
CYTOLOGIST CVX/VAG CYTO: NORMAL
CYTOLOGY CVX/VAG DOC CYTO: NORMAL
CYTOLOGY CVX/VAG DOC THIN PREP: NORMAL
DX ICD CODE: NORMAL
HIV 1 & 2 AB SER-IMP: NORMAL
HPV I/H RISK 1 DNA CVX QL PROBE+SIG AMP: NEGATIVE
OTHER STN SPEC: NORMAL
STAT OF ADQ CVX/VAG CYTO-IMP: NORMAL

## 2020-09-28 RX ORDER — AMOXICILLIN 500 MG/1
TABLET, FILM COATED ORAL
Qty: 4 TABLET | Refills: 1 | Status: SHIPPED | OUTPATIENT
Start: 2020-09-28

## 2020-12-14 RX ORDER — BENZONATATE 100 MG/1
CAPSULE ORAL
Qty: 30 CAPSULE | Refills: 0 | Status: SHIPPED | OUTPATIENT
Start: 2020-12-14

## 2021-03-16 ENCOUNTER — BULK ORDERING (OUTPATIENT)
Dept: CASE MANAGEMENT | Facility: OTHER | Age: 61
End: 2021-03-16

## 2021-03-16 DIAGNOSIS — Z23 IMMUNIZATION DUE: ICD-10-CM
